# Patient Record
Sex: FEMALE | Race: WHITE | Employment: UNEMPLOYED | URBAN - METROPOLITAN AREA
[De-identification: names, ages, dates, MRNs, and addresses within clinical notes are randomized per-mention and may not be internally consistent; named-entity substitution may affect disease eponyms.]

---

## 2017-05-24 ENCOUNTER — OFFICE VISIT (OUTPATIENT)
Dept: ORTHOPEDIC SURGERY | Age: 47
End: 2017-05-24

## 2017-05-24 VITALS
DIASTOLIC BLOOD PRESSURE: 71 MMHG | HEART RATE: 78 BPM | HEIGHT: 65 IN | SYSTOLIC BLOOD PRESSURE: 107 MMHG | BODY MASS INDEX: 19.99 KG/M2 | WEIGHT: 120 LBS

## 2017-05-24 DIAGNOSIS — M25.561 CHRONIC PAIN OF RIGHT KNEE: ICD-10-CM

## 2017-05-24 DIAGNOSIS — M25.562 CHRONIC PAIN OF LEFT KNEE: ICD-10-CM

## 2017-05-24 DIAGNOSIS — S83.012D: ICD-10-CM

## 2017-05-24 DIAGNOSIS — M22.42 PATELLA, CHONDROMALACIA, LEFT: ICD-10-CM

## 2017-05-24 DIAGNOSIS — G89.29 CHRONIC PAIN OF RIGHT KNEE: ICD-10-CM

## 2017-05-24 DIAGNOSIS — M17.11 PRIMARY OSTEOARTHRITIS OF RIGHT KNEE: Primary | ICD-10-CM

## 2017-05-24 DIAGNOSIS — M17.12 PRIMARY OSTEOARTHRITIS OF LEFT KNEE: ICD-10-CM

## 2017-05-24 DIAGNOSIS — M22.2X2 PATELLOFEMORAL STRESS SYNDROME OF LEFT KNEE: ICD-10-CM

## 2017-05-24 DIAGNOSIS — G89.29 CHRONIC PAIN OF LEFT KNEE: ICD-10-CM

## 2017-05-24 PROCEDURE — 99214 OFFICE O/P EST MOD 30 MIN: CPT | Performed by: ORTHOPAEDIC SURGERY

## 2017-05-24 RX ORDER — TRAZODONE HYDROCHLORIDE 50 MG/1
TABLET ORAL
Refills: 1 | COMMUNITY
Start: 2017-03-15 | End: 2017-10-11

## 2017-05-24 RX ORDER — ALPRAZOLAM 0.5 MG/1
TABLET ORAL
Refills: 2 | COMMUNITY
Start: 2017-03-06

## 2017-05-24 RX ORDER — LORAZEPAM 0.5 MG/1
TABLET ORAL
Refills: 0 | COMMUNITY
Start: 2017-04-06 | End: 2017-08-30

## 2017-05-24 RX ORDER — RISPERIDONE 0.5 MG/1
3 TABLET, FILM COATED ORAL EVERY EVENING
Refills: 1 | COMMUNITY
Start: 2017-05-03

## 2017-05-24 RX ORDER — OLANZAPINE 10 MG/1
TABLET ORAL
Refills: 3 | COMMUNITY
Start: 2017-05-16 | End: 2017-08-30

## 2017-05-24 RX ORDER — MELOXICAM 15 MG/1
TABLET ORAL
Refills: 3 | COMMUNITY
Start: 2017-04-06 | End: 2017-11-01 | Stop reason: ALTCHOICE

## 2017-06-01 ENCOUNTER — TELEPHONE (OUTPATIENT)
Dept: ORTHOPEDIC SURGERY | Age: 47
End: 2017-06-01

## 2017-06-05 ENCOUNTER — TELEPHONE (OUTPATIENT)
Dept: ORTHOPEDIC SURGERY | Age: 47
End: 2017-06-05

## 2017-06-09 ENCOUNTER — OFFICE VISIT (OUTPATIENT)
Dept: ORTHOPEDIC SURGERY | Age: 47
End: 2017-06-09

## 2017-06-09 VITALS
HEART RATE: 84 BPM | BODY MASS INDEX: 19.99 KG/M2 | WEIGHT: 120 LBS | DIASTOLIC BLOOD PRESSURE: 67 MMHG | HEIGHT: 65 IN | SYSTOLIC BLOOD PRESSURE: 106 MMHG

## 2017-06-09 DIAGNOSIS — M22.42 PATELLA, CHONDROMALACIA, LEFT: ICD-10-CM

## 2017-06-09 DIAGNOSIS — M25.562 CHRONIC PAIN OF LEFT KNEE: Primary | ICD-10-CM

## 2017-06-09 DIAGNOSIS — G89.29 CHRONIC PAIN OF LEFT KNEE: Primary | ICD-10-CM

## 2017-06-09 DIAGNOSIS — M22.2X2 PATELLOFEMORAL STRESS SYNDROME OF LEFT KNEE: ICD-10-CM

## 2017-06-09 DIAGNOSIS — M17.12 PRIMARY OSTEOARTHRITIS OF LEFT KNEE: ICD-10-CM

## 2017-06-09 DIAGNOSIS — S83.012S: ICD-10-CM

## 2017-06-09 PROCEDURE — 99213 OFFICE O/P EST LOW 20 MIN: CPT | Performed by: ORTHOPAEDIC SURGERY

## 2017-06-20 ENCOUNTER — EVALUATION (OUTPATIENT)
Dept: PHYSICAL THERAPY | Age: 47
End: 2017-06-20

## 2017-06-20 DIAGNOSIS — M25.562 CHRONIC PAIN OF LEFT KNEE: Primary | ICD-10-CM

## 2017-06-20 DIAGNOSIS — M22.42 CHONDROMALACIA PATELLA, LEFT: ICD-10-CM

## 2017-06-20 DIAGNOSIS — M17.12 PRIMARY OSTEOARTHRITIS OF LEFT KNEE: ICD-10-CM

## 2017-06-20 DIAGNOSIS — G89.29 CHRONIC PAIN OF LEFT KNEE: Primary | ICD-10-CM

## 2017-06-20 PROCEDURE — G8979 MOBILITY GOAL STATUS: HCPCS | Performed by: PHYSICAL THERAPIST

## 2017-06-20 PROCEDURE — 97161 PT EVAL LOW COMPLEX 20 MIN: CPT | Performed by: PHYSICAL THERAPIST

## 2017-06-20 PROCEDURE — 97530 THERAPEUTIC ACTIVITIES: CPT | Performed by: PHYSICAL THERAPIST

## 2017-06-20 PROCEDURE — G8978 MOBILITY CURRENT STATUS: HCPCS | Performed by: PHYSICAL THERAPIST

## 2017-06-20 PROCEDURE — 97110 THERAPEUTIC EXERCISES: CPT | Performed by: PHYSICAL THERAPIST

## 2017-07-31 ENCOUNTER — TELEPHONE (OUTPATIENT)
Dept: ORTHOPEDIC SURGERY | Age: 47
End: 2017-07-31

## 2017-08-18 ENCOUNTER — HOSPITAL ENCOUNTER (OUTPATIENT)
Dept: SURGERY | Age: 47
Discharge: HOME | End: 2017-07-11
Attending: ORTHOPAEDIC SURGERY | Admitting: ORTHOPAEDIC SURGERY

## 2017-08-22 RX ORDER — GABAPENTIN 300 MG/1
300 CAPSULE ORAL
Status: CANCELLED | OUTPATIENT
Start: 2017-08-22 | End: 2017-08-22

## 2017-08-22 RX ORDER — TRANEXAMIC ACID 650 1/1
1950 TABLET ORAL ONCE
Status: CANCELLED | OUTPATIENT
Start: 2017-08-22 | End: 2017-08-22

## 2017-08-23 ENCOUNTER — HOSPITAL ENCOUNTER (OUTPATIENT)
Dept: PREADMISSION TESTING | Age: 47
Discharge: HOME | End: 2017-08-23
Attending: ORTHOPAEDIC SURGERY | Admitting: ORTHOPAEDIC SURGERY

## 2017-08-23 VITALS
HEIGHT: 64 IN | HEART RATE: 80 BPM | BODY MASS INDEX: 22.02 KG/M2 | SYSTOLIC BLOOD PRESSURE: 86 MMHG | OXYGEN SATURATION: 100 % | TEMPERATURE: 97.1 F | RESPIRATION RATE: 16 BRPM | WEIGHT: 129 LBS | DIASTOLIC BLOOD PRESSURE: 57 MMHG

## 2017-08-23 LAB
ABO/RH: NORMAL
ANION GAP SERPL CALCULATED.3IONS-SCNC: 12 MMOL/L (ref 3–16)
ANTIBODY SCREEN: NORMAL
APTT: 29.9 SEC (ref 24.1–34.9)
BASOPHILS ABSOLUTE: 0 K/UL (ref 0–0.2)
BASOPHILS RELATIVE PERCENT: 0.4 %
BILIRUBIN URINE: NEGATIVE
BLOOD, URINE: NEGATIVE
BUN BLDV-MCNC: 9 MG/DL (ref 7–20)
CALCIUM SERPL-MCNC: 9.5 MG/DL (ref 8.3–10.6)
CHLORIDE BLD-SCNC: 100 MMOL/L (ref 99–110)
CLARITY: CLEAR
CO2: 27 MMOL/L (ref 21–32)
COLOR: YELLOW
CREAT SERPL-MCNC: 0.6 MG/DL (ref 0.6–1.1)
EKG ATRIAL RATE: 67 BPM
EKG DIAGNOSIS: NORMAL
EKG P AXIS: 29 DEGREES
EKG P-R INTERVAL: 128 MS
EKG Q-T INTERVAL: 396 MS
EKG QRS DURATION: 82 MS
EKG QTC CALCULATION (BAZETT): 418 MS
EKG R AXIS: 64 DEGREES
EKG T AXIS: 40 DEGREES
EKG VENTRICULAR RATE: 67 BPM
EOSINOPHILS ABSOLUTE: 0.1 K/UL (ref 0–0.6)
EOSINOPHILS RELATIVE PERCENT: 3.5 %
GFR AFRICAN AMERICAN: >60
GFR NON-AFRICAN AMERICAN: >60
GLUCOSE BLD-MCNC: 108 MG/DL (ref 70–99)
GLUCOSE URINE: NEGATIVE MG/DL
HCT VFR BLD CALC: 36.8 % (ref 36–48)
HEMOGLOBIN: 12.3 G/DL (ref 12–16)
INR BLD: 0.95 (ref 0.85–1.15)
KETONES, URINE: NEGATIVE MG/DL
LEUKOCYTE ESTERASE, URINE: NEGATIVE
LYMPHOCYTES ABSOLUTE: 1.2 K/UL (ref 1–5.1)
LYMPHOCYTES RELATIVE PERCENT: 28.3 %
MCH RBC QN AUTO: 31.4 PG (ref 26–34)
MCHC RBC AUTO-ENTMCNC: 33.6 G/DL (ref 31–36)
MCV RBC AUTO: 93.4 FL (ref 80–100)
MICROSCOPIC EXAMINATION: NORMAL
MONOCYTES ABSOLUTE: 0.5 K/UL (ref 0–1.3)
MONOCYTES RELATIVE PERCENT: 11.3 %
NEUTROPHILS ABSOLUTE: 2.4 K/UL (ref 1.7–7.7)
NEUTROPHILS RELATIVE PERCENT: 56.5 %
NITRITE, URINE: NEGATIVE
PDW BLD-RTO: 12.4 % (ref 12.4–15.4)
PH UA: 6
PLATELET # BLD: 248 K/UL (ref 135–450)
PMV BLD AUTO: 7.5 FL (ref 5–10.5)
POTASSIUM SERPL-SCNC: 3.8 MMOL/L (ref 3.5–5.1)
PROTEIN UA: NEGATIVE MG/DL
PROTHROMBIN TIME: 10.7 SEC (ref 9.6–13)
RBC # BLD: 3.94 M/UL (ref 4–5.2)
SODIUM BLD-SCNC: 139 MMOL/L (ref 136–145)
SPECIFIC GRAVITY UA: 1.01
URINE TYPE: NORMAL
UROBILINOGEN, URINE: 0.2 E.U./DL
WBC # BLD: 4.2 K/UL (ref 4–11)

## 2017-08-23 PROCEDURE — 93010 ELECTROCARDIOGRAM REPORT: CPT | Performed by: INTERNAL MEDICINE

## 2017-08-23 RX ORDER — PANTOPRAZOLE SODIUM 40 MG/1
40 GRANULE, DELAYED RELEASE ORAL
COMMUNITY

## 2017-08-23 RX ORDER — ACETAMINOPHEN 325 MG/1
650 TABLET ORAL EVERY 6 HOURS PRN
COMMUNITY
End: 2017-11-01 | Stop reason: ALTCHOICE

## 2017-08-23 RX ORDER — DICYCLOMINE HYDROCHLORIDE 10 MG/1
10 CAPSULE ORAL
COMMUNITY

## 2017-08-23 RX ORDER — POLYETHYLENE GLYCOL 3350 17 G/17G
17 POWDER, FOR SOLUTION ORAL DAILY PRN
COMMUNITY
End: 2017-11-01 | Stop reason: ALTCHOICE

## 2017-08-24 LAB
MRSA SCREEN RT-PCR: ABNORMAL
URINE CULTURE, ROUTINE: NORMAL

## 2017-08-30 ENCOUNTER — OFFICE VISIT (OUTPATIENT)
Dept: ORTHOPEDIC SURGERY | Age: 47
End: 2017-08-30

## 2017-08-30 VITALS
WEIGHT: 128.97 LBS | BODY MASS INDEX: 21.49 KG/M2 | DIASTOLIC BLOOD PRESSURE: 66 MMHG | HEIGHT: 65 IN | SYSTOLIC BLOOD PRESSURE: 100 MMHG | HEART RATE: 78 BPM

## 2017-08-30 DIAGNOSIS — M25.462 EFFUSION OF LEFT KNEE: ICD-10-CM

## 2017-08-30 DIAGNOSIS — M25.562 CHRONIC PAIN OF LEFT KNEE: ICD-10-CM

## 2017-08-30 DIAGNOSIS — S83.012D: Primary | ICD-10-CM

## 2017-08-30 DIAGNOSIS — G89.29 CHRONIC PAIN OF LEFT KNEE: ICD-10-CM

## 2017-08-30 DIAGNOSIS — M22.2X2 PATELLOFEMORAL PAIN SYNDROME OF LEFT KNEE: ICD-10-CM

## 2017-08-30 PROCEDURE — 99213 OFFICE O/P EST LOW 20 MIN: CPT | Performed by: ORTHOPAEDIC SURGERY

## 2017-09-18 ENCOUNTER — EVALUATION (OUTPATIENT)
Dept: PHYSICAL THERAPY | Age: 47
End: 2017-09-18

## 2017-09-18 DIAGNOSIS — R53.1 DECREASED STRENGTH: ICD-10-CM

## 2017-09-18 DIAGNOSIS — M25.60 DECREASED RANGE OF MOTION: Primary | ICD-10-CM

## 2017-09-18 DIAGNOSIS — M17.12 OSTEOARTHRITIS OF LEFT KNEE, UNSPECIFIED OSTEOARTHRITIS TYPE: ICD-10-CM

## 2017-09-18 DIAGNOSIS — Z98.890 S/P KNEE SURGERY: Primary | ICD-10-CM

## 2017-09-18 PROCEDURE — 97110 THERAPEUTIC EXERCISES: CPT | Performed by: PHYSICAL THERAPIST

## 2017-09-18 PROCEDURE — G8978 MOBILITY CURRENT STATUS: HCPCS | Performed by: PHYSICAL THERAPIST

## 2017-09-18 PROCEDURE — 97016 VASOPNEUMATIC DEVICE THERAPY: CPT | Performed by: PHYSICAL THERAPIST

## 2017-09-18 PROCEDURE — 97161 PT EVAL LOW COMPLEX 20 MIN: CPT | Performed by: PHYSICAL THERAPIST

## 2017-09-18 PROCEDURE — G8979 MOBILITY GOAL STATUS: HCPCS | Performed by: PHYSICAL THERAPIST

## 2017-09-18 PROCEDURE — 97014 ELECTRIC STIMULATION THERAPY: CPT | Performed by: PHYSICAL THERAPIST

## 2017-09-20 ENCOUNTER — TREATMENT (OUTPATIENT)
Dept: PHYSICAL THERAPY | Age: 47
End: 2017-09-20

## 2017-09-20 ENCOUNTER — OFFICE VISIT (OUTPATIENT)
Dept: ORTHOPEDIC SURGERY | Age: 47
End: 2017-09-20

## 2017-09-20 VITALS
BODY MASS INDEX: 21.53 KG/M2 | HEART RATE: 86 BPM | WEIGHT: 126.1 LBS | SYSTOLIC BLOOD PRESSURE: 103 MMHG | DIASTOLIC BLOOD PRESSURE: 60 MMHG | HEIGHT: 64 IN

## 2017-09-20 DIAGNOSIS — M25.562 CHRONIC PAIN OF LEFT KNEE: ICD-10-CM

## 2017-09-20 DIAGNOSIS — R53.1 DECREASED STRENGTH: ICD-10-CM

## 2017-09-20 DIAGNOSIS — M22.42 CHONDROMALACIA PATELLA, LEFT: ICD-10-CM

## 2017-09-20 DIAGNOSIS — M25.60 DECREASED RANGE OF MOTION: Primary | ICD-10-CM

## 2017-09-20 DIAGNOSIS — G89.29 CHRONIC PAIN OF LEFT KNEE: ICD-10-CM

## 2017-09-20 DIAGNOSIS — Z96.652 STATUS POST LEFT PARTIAL KNEE REPLACEMENT: Primary | ICD-10-CM

## 2017-09-20 DIAGNOSIS — M17.12 OSTEOARTHRITIS OF LEFT KNEE, UNSPECIFIED OSTEOARTHRITIS TYPE: ICD-10-CM

## 2017-09-20 PROCEDURE — 97014 ELECTRIC STIMULATION THERAPY: CPT | Performed by: PHYSICAL THERAPIST

## 2017-09-20 PROCEDURE — 97016 VASOPNEUMATIC DEVICE THERAPY: CPT | Performed by: PHYSICAL THERAPIST

## 2017-09-20 PROCEDURE — 97110 THERAPEUTIC EXERCISES: CPT | Performed by: PHYSICAL THERAPIST

## 2017-09-20 PROCEDURE — 99024 POSTOP FOLLOW-UP VISIT: CPT | Performed by: ORTHOPAEDIC SURGERY

## 2017-09-25 ENCOUNTER — TREATMENT (OUTPATIENT)
Dept: PHYSICAL THERAPY | Age: 47
End: 2017-09-25

## 2017-09-25 DIAGNOSIS — M25.60 DECREASED RANGE OF MOTION: Primary | ICD-10-CM

## 2017-09-25 DIAGNOSIS — M17.12 OSTEOARTHRITIS OF LEFT KNEE, UNSPECIFIED OSTEOARTHRITIS TYPE: ICD-10-CM

## 2017-09-25 DIAGNOSIS — M25.562 CHRONIC PAIN OF LEFT KNEE: ICD-10-CM

## 2017-09-25 DIAGNOSIS — R53.1 DECREASED STRENGTH: ICD-10-CM

## 2017-09-25 DIAGNOSIS — G89.29 CHRONIC PAIN OF LEFT KNEE: ICD-10-CM

## 2017-09-25 DIAGNOSIS — M22.42 CHONDROMALACIA PATELLA, LEFT: ICD-10-CM

## 2017-09-25 PROCEDURE — 97016 VASOPNEUMATIC DEVICE THERAPY: CPT | Performed by: PHYSICAL THERAPIST

## 2017-09-25 PROCEDURE — 97110 THERAPEUTIC EXERCISES: CPT | Performed by: PHYSICAL THERAPIST

## 2017-09-25 PROCEDURE — 97014 ELECTRIC STIMULATION THERAPY: CPT | Performed by: PHYSICAL THERAPIST

## 2017-09-29 ENCOUNTER — TREATMENT (OUTPATIENT)
Dept: PHYSICAL THERAPY | Age: 47
End: 2017-09-29

## 2017-09-29 DIAGNOSIS — M17.12 OSTEOARTHRITIS OF LEFT KNEE, UNSPECIFIED OSTEOARTHRITIS TYPE: ICD-10-CM

## 2017-09-29 DIAGNOSIS — G89.29 CHRONIC PAIN OF LEFT KNEE: ICD-10-CM

## 2017-09-29 DIAGNOSIS — M25.562 CHRONIC PAIN OF LEFT KNEE: ICD-10-CM

## 2017-09-29 DIAGNOSIS — R53.1 DECREASED STRENGTH: ICD-10-CM

## 2017-09-29 DIAGNOSIS — M22.42 CHONDROMALACIA PATELLA, LEFT: ICD-10-CM

## 2017-09-29 DIAGNOSIS — M25.60 DECREASED RANGE OF MOTION: Primary | ICD-10-CM

## 2017-09-29 PROCEDURE — 97110 THERAPEUTIC EXERCISES: CPT | Performed by: PHYSICAL THERAPIST

## 2017-09-29 PROCEDURE — 97016 VASOPNEUMATIC DEVICE THERAPY: CPT | Performed by: PHYSICAL THERAPIST

## 2017-09-29 PROCEDURE — 97014 ELECTRIC STIMULATION THERAPY: CPT | Performed by: PHYSICAL THERAPIST

## 2017-10-04 ENCOUNTER — TREATMENT (OUTPATIENT)
Dept: PHYSICAL THERAPY | Age: 47
End: 2017-10-04

## 2017-10-04 DIAGNOSIS — R53.1 DECREASED STRENGTH: ICD-10-CM

## 2017-10-04 DIAGNOSIS — M17.12 OSTEOARTHRITIS OF LEFT KNEE, UNSPECIFIED OSTEOARTHRITIS TYPE: ICD-10-CM

## 2017-10-04 DIAGNOSIS — M22.42 CHONDROMALACIA PATELLA, LEFT: ICD-10-CM

## 2017-10-04 DIAGNOSIS — M25.562 CHRONIC PAIN OF LEFT KNEE: ICD-10-CM

## 2017-10-04 DIAGNOSIS — G89.29 CHRONIC PAIN OF LEFT KNEE: ICD-10-CM

## 2017-10-04 DIAGNOSIS — M25.60 DECREASED RANGE OF MOTION: Primary | ICD-10-CM

## 2017-10-04 PROCEDURE — 97110 THERAPEUTIC EXERCISES: CPT | Performed by: PHYSICAL THERAPIST

## 2017-10-04 PROCEDURE — 97016 VASOPNEUMATIC DEVICE THERAPY: CPT | Performed by: PHYSICAL THERAPIST

## 2017-10-04 PROCEDURE — 97014 ELECTRIC STIMULATION THERAPY: CPT | Performed by: PHYSICAL THERAPIST

## 2017-10-04 NOTE — FLOWSHEET NOTE
Omar Clifton PT, Ramesh Brooks 89    Physical Therapist [place] license #[id number]  Physical Therapist [place] license #[id number]

## 2017-10-06 ENCOUNTER — TREATMENT (OUTPATIENT)
Dept: PHYSICAL THERAPY | Age: 47
End: 2017-10-06

## 2017-10-06 DIAGNOSIS — M25.562 CHRONIC PAIN OF LEFT KNEE: ICD-10-CM

## 2017-10-06 DIAGNOSIS — M25.60 DECREASED RANGE OF MOTION: Primary | ICD-10-CM

## 2017-10-06 DIAGNOSIS — R53.1 DECREASED STRENGTH: ICD-10-CM

## 2017-10-06 DIAGNOSIS — G89.29 CHRONIC PAIN OF LEFT KNEE: ICD-10-CM

## 2017-10-06 DIAGNOSIS — M17.12 OSTEOARTHRITIS OF LEFT KNEE, UNSPECIFIED OSTEOARTHRITIS TYPE: ICD-10-CM

## 2017-10-06 PROCEDURE — 97110 THERAPEUTIC EXERCISES: CPT | Performed by: PHYSICAL THERAPIST

## 2017-10-06 PROCEDURE — 97016 VASOPNEUMATIC DEVICE THERAPY: CPT | Performed by: PHYSICAL THERAPIST

## 2017-10-06 PROCEDURE — 97014 ELECTRIC STIMULATION THERAPY: CPT | Performed by: PHYSICAL THERAPIST

## 2017-10-06 NOTE — FLOWSHEET NOTE
[facility]   Phone: [phone]    Fax: [phone]                                                       Physical Therapy Daily Treatment Note  Date:  10/6/2017    Patient Name:  Haven Leyden    :  1970  MRN: [id number]  Medical/Treatment Diagnosis Information:  Diagnosis: L patella femoral replacement; Z98.890 9/15/17  Treatment Diagnosis: dec ROM, dec strength, knee pain, dec gt skills,   Insurance/Certification information:  PT Insurance Information: [employer]  Physician Information:  Referring Practitioner: Guy Files of care signed (Y/N):     Date of Patient follow up with Physician: today    G-Code (if applicable):      Date G-Code Applied:  17  PT G-Codes  Functional Assessment Tool Used: LEFS  Score: 880=10%  Functional Limitation: Mobility: Walking and moving around  Mobility: Walking and Moving Around Current Status (): At least 80 percent but less than 100 percent impaired, limited or restricted  Mobility: Walking and Moving Around Goal Status (): At least 1 percent but less than 20 percent impaired, limited or restricted    Progress Note: [x]  Yes  []  No  Next due by: Visit #10       Latex Allergy:  [x]NO      []YES  Preferred Language for Healthcare:   [x]English       []other:    Visit # Insurance Allowable   6      Pain level:  3/10 w/ percocet     SUBJECTIVE:  Pt is having pain in R knee that is bothering her. L knee pain is improving and she is doing more. OBJECTIVE: See eval  Observation: mod edema L knee but not extending into lower leg; incision healing well w/o signs of infection noted; steri strips removed; Amb w/ 2 crutches and dec stride length w/ dec knee ext and limp noted;   Test measurements:      Flexibility L R Comment   Hamstring Min tightness  10/6/17   Gastroc Min tightness     ITB      Quad                ROM PROM AROM Overpressure Comment    L R L R L R 10/6/17   Flexion 130         Extension   0 proximal hip and LE with self care and ADLs  [] (60146) Gait Re-education- Provided training and instruction to the patient for proper LE, core and proximal hip recruitment and positioning and eccentric body weight control with ambulation re-education including up and down stairs     Home Exercise Program:    [x] (04574) Reviewed/Progressed HEP activities related to strengthening, flexibility, endurance, ROM of core, proximal hip and LE for functional self-care, mobility, lifting and ambulation/stair navigation 9/18/17 written HEP given 9/29/17 Reviewed  [] (81164)Reviewed/Progressed HEP activities related to improving balance, coordination, kinesthetic sense, posture, motor skill, proprioception of core, proximal hip and LE for self care, mobility, lifting, and ambulation/stair navigation      Manual Treatments:  PROM / STM / Oscillations-Mobs:  G-I, II, III, IV (PA's, Inf., Post.)  [] (56031) Provided manual therapy to mobilize LE, proximal hip and/or LS spine soft tissue/joints for the purpose of modulating pain, promoting relaxation,  increasing ROM, reducing/eliminating soft tissue swelling/inflammation/restriction, improving soft tissue extensibility and allowing for proper ROM for normal function with self care, mobility, lifting and ambulation. Modalities:  VASO x15 min    Charges:  Timed Code Treatment Minutes: 35   Total Treatment Minutes: 60     [] EVAL (LOW) 60740 (typically 20 minutes face-to-face)  [] EVAL (MOD) 62942 (typically 30 minutes face-to-face)  [] EVAL (HIGH) 36917 (typically 45 minutes face-to-face)  [] RE-EVAL   [x] AO(32828) x  2   [] IONTO  [] NMR (34644) x      [x] VASO  [] Manual (15979) x       [] Other:  [] TA (57937) x       [] Mech Traction (05407)  [] ES(attended) (57623)      [x] ES (un) (82757): x15 min      GOALS:  Patient stated goal: wants to get ROM back and walk w/o pain; Therapist goals for Patient:   Short Term Goals: To be achieved in: 2 weeks  1.  Independent in HEP and progression per patient tolerance, in order to prevent re-injury. MET  2. Patient will have a decrease in pain to facilitate improvement in movement, function, and ADLs as indicated by Functional Deficits. Long Term Goals: To be achieved in: 12 weeks  1. Disability index score of 10% or less for the LEFS to assist with reaching prior level of function. 2. Patient will demonstrate increased AROM to 0-130 to allow for proper joint functioning as indicated by patients Functional Deficits. 3. Patient will demonstrate an increase in Strength to good proximal hip strength and control, within 5lb HHD in LE to allow for proper functional mobility as indicated by patients Functional Deficits. 4. Patient will return to all functional activities without increased symptoms or restriction. 5. Pt will be able to walk w/o deviations or AD for 45 min for functional activities. (patient specific functional goal    Progression Towards Functional goals:  [] Patient is progressing as expected towards functional goals listed. [] Progression is slowed due to complexities listed. [] Progression has been slowed due to co-morbidities. [x] Plan just implemented, too soon to assess goals progression  [] Other:     ASSESSMENT: Slight dec in ROM in L knee. Quad contraction a little difficult today but did improve w/ biofeedback. Cuing on gt skills w/o AD and steps to eliminate deviations. More edema in L knee today that may be related to inc [address] on L LE. Treatment/Activity Tolerance:  [x] Patient tolerated treatment well [x] Patient limited by fatique  [] Patient limited by pain  [] Patient limited by other medical complications  [] Other:     Prognosis: [x] Good [] Fair  [] Poor    Patient Requires Follow-up: [x] Yes  [] No    PLAN: Cont therapy s/p L knee surg. Consider Inc ht of steps next session.   [x] Continue per plan of care [] Alter current plan (see comments)  [x] Plan of care initiated [] Hold pending MD visit [] Discharge    Electronically signed by: Mary Jean PT, Ramesh Brooks 89    Physical Therapist [facility] license #[id number]  Physical Therapist [place] license #[id number]

## 2017-10-11 ENCOUNTER — TREATMENT (OUTPATIENT)
Dept: PHYSICAL THERAPY | Age: 47
End: 2017-10-11

## 2017-10-11 ENCOUNTER — OFFICE VISIT (OUTPATIENT)
Dept: ORTHOPEDIC SURGERY | Age: 47
End: 2017-10-11

## 2017-10-11 VITALS
DIASTOLIC BLOOD PRESSURE: 71 MMHG | HEIGHT: 64 IN | HEART RATE: 102 BPM | WEIGHT: 126.1 LBS | BODY MASS INDEX: 21.53 KG/M2 | SYSTOLIC BLOOD PRESSURE: 103 MMHG

## 2017-10-11 DIAGNOSIS — G89.29 CHRONIC PAIN OF LEFT KNEE: ICD-10-CM

## 2017-10-11 DIAGNOSIS — Z96.652 STATUS POST LEFT PARTIAL KNEE REPLACEMENT: Primary | ICD-10-CM

## 2017-10-11 DIAGNOSIS — M25.562 CHRONIC PAIN OF LEFT KNEE: ICD-10-CM

## 2017-10-11 DIAGNOSIS — M17.12 OSTEOARTHRITIS OF LEFT KNEE, UNSPECIFIED OSTEOARTHRITIS TYPE: ICD-10-CM

## 2017-10-11 DIAGNOSIS — R53.1 DECREASED STRENGTH: ICD-10-CM

## 2017-10-11 DIAGNOSIS — M22.42 CHONDROMALACIA PATELLA, LEFT: ICD-10-CM

## 2017-10-11 DIAGNOSIS — M25.60 DECREASED RANGE OF MOTION: Primary | ICD-10-CM

## 2017-10-11 PROCEDURE — 99024 POSTOP FOLLOW-UP VISIT: CPT | Performed by: ORTHOPAEDIC SURGERY

## 2017-10-11 PROCEDURE — 73562 X-RAY EXAM OF KNEE 3: CPT | Performed by: ORTHOPAEDIC SURGERY

## 2017-10-11 PROCEDURE — 97014 ELECTRIC STIMULATION THERAPY: CPT | Performed by: PHYSICAL THERAPIST

## 2017-10-11 PROCEDURE — 97110 THERAPEUTIC EXERCISES: CPT | Performed by: PHYSICAL THERAPIST

## 2017-10-11 PROCEDURE — 97016 VASOPNEUMATIC DEVICE THERAPY: CPT | Performed by: PHYSICAL THERAPIST

## 2017-10-11 RX ORDER — PREDNISONE 20 MG/1
20 TABLET ORAL 2 TIMES DAILY
Qty: 20 TABLET | Refills: 0 | Status: SHIPPED | OUTPATIENT
Start: 2017-10-11 | End: 2017-11-01 | Stop reason: ALTCHOICE

## 2017-10-11 RX ORDER — HYDROCODONE BITARTRATE AND ACETAMINOPHEN 5; 325 MG/1; MG/1
2 TABLET ORAL 3 TIMES DAILY PRN
Qty: 40 TABLET | Refills: 0 | Status: SHIPPED | OUTPATIENT
Start: 2017-10-11 | End: 2017-11-22 | Stop reason: SDUPTHER

## 2017-10-11 NOTE — PLAN OF CARE
ambulation/stair navigation      Manual Treatments:  PROM / STM / Oscillations-Mobs:  G-I, II, III, IV (PA's, Inf., Post.)  [] (92226) Provided manual therapy to mobilize LE, proximal hip and/or LS spine soft tissue/joints for the purpose of modulating pain, promoting relaxation,  increasing ROM, reducing/eliminating soft tissue swelling/inflammation/restriction, improving soft tissue extensibility and allowing for proper ROM for normal function with self care, mobility, lifting and ambulation. Modalities:  VASO x15 min    Charges:  Timed Code Treatment Minutes: 35   Total Treatment Minutes: 60     [] EVAL (LOW) 23199 (typically 20 minutes face-to-face)  [] EVAL (MOD) 94631 (typically 30 minutes face-to-face)  [] EVAL (HIGH) 27202 (typically 45 minutes face-to-face)  [] RE-EVAL   [x] LY(87345) x  2   [] IONTO  [] NMR (98966) x      [x] VASO  [] Manual (38037) x       [] Other:  [] TA (59981) x       [] Mech Traction (82993)  [] ES(attended) (78620)      [x] ES (un) (24562): x15 min      GOALS:  Patient stated goal: wants to get ROM back and walk w/o pain; Therapist goals for Patient:   Short Term Goals: To be achieved in: 2 weeks  1. Independent in HEP and progression per patient tolerance, in order to prevent re-injury. MET  2. Patient will have a decrease in pain to facilitate improvement in movement, function, and ADLs as indicated by Functional Deficits. Long Term Goals: To be achieved in: 12 weeks  1. Disability index score of 10% or less for the LEFS to assist with reaching prior level of function. 2. Patient will demonstrate increased AROM to 0-130 to allow for proper joint functioning as indicated by patients Functional Deficits. 3. Patient will demonstrate an increase in Strength to good proximal hip strength and control, within 5lb HHD in LE to allow for proper functional mobility as indicated by patients Functional Deficits.    4. Patient will return to all functional activities without increased symptoms or restriction. 5. Pt will be able to walk w/o deviations or AD for 45 min for functional activities. (patient specific functional goal    Progression Towards Functional goals:  [] Patient is progressing as expected towards functional goals listed. [] Progression is slowed due to complexities listed. [] Progression has been slowed due to co-morbidities. [x] Plan just implemented, too soon to assess goals progression  [] Other:     ASSESSMENT: Pt is more active w/ inc edema in L knee. Quad is having a harder time darrel. Function has improved. Strength has taken a small step back due to fair- quad contraction. Pt is having some inc symptoms of depression w/ MD and psychiatrist away of symptoms. Treatment/Activity Tolerance:  [x] Patient tolerated treatment well [x] Patient limited by fatique  [] Patient limited by pain  [] Patient limited by other medical complications  [] Other:     Prognosis: [x] Good [] Fair  [] Poor    Patient Requires Follow-up: [x] Yes  [] No    PLAN: Cont therapy s/p L knee surg. Consider adding wts next session.   [x] Continue per plan of care [] Alter current plan (see comments)  [x] Plan of care initiated [] Hold pending MD visit [] Discharge    Electronically signed by: Ferdinand Stauffer PT, Ramesh Brooks 89    Physical Therapist [place] license #[id number]  Physical Therapist [place] license #[id number]

## 2017-10-13 ENCOUNTER — TREATMENT (OUTPATIENT)
Dept: PHYSICAL THERAPY | Age: 47
End: 2017-10-13

## 2017-10-13 DIAGNOSIS — M25.60 DECREASED RANGE OF MOTION: Primary | ICD-10-CM

## 2017-10-13 DIAGNOSIS — M25.562 CHRONIC PAIN OF LEFT KNEE: ICD-10-CM

## 2017-10-13 DIAGNOSIS — M17.12 OSTEOARTHRITIS OF LEFT KNEE, UNSPECIFIED OSTEOARTHRITIS TYPE: ICD-10-CM

## 2017-10-13 DIAGNOSIS — G89.29 CHRONIC PAIN OF LEFT KNEE: ICD-10-CM

## 2017-10-13 DIAGNOSIS — M17.12 PRIMARY OSTEOARTHRITIS OF LEFT KNEE: ICD-10-CM

## 2017-10-13 DIAGNOSIS — R53.1 DECREASED STRENGTH: ICD-10-CM

## 2017-10-13 DIAGNOSIS — M22.42 CHONDROMALACIA PATELLA, LEFT: ICD-10-CM

## 2017-10-13 PROCEDURE — 97016 VASOPNEUMATIC DEVICE THERAPY: CPT | Performed by: PHYSICAL THERAPIST

## 2017-10-13 PROCEDURE — 97014 ELECTRIC STIMULATION THERAPY: CPT | Performed by: PHYSICAL THERAPIST

## 2017-10-13 PROCEDURE — 97110 THERAPEUTIC EXERCISES: CPT | Performed by: PHYSICAL THERAPIST

## 2017-10-13 PROCEDURE — 97530 THERAPEUTIC ACTIVITIES: CPT | Performed by: PHYSICAL THERAPIST

## 2017-10-13 NOTE — FLOWSHEET NOTE
[facility]   Phone: [phone]    Fax: [phone]                                                       Physical Therapy Daily Treatment Note        Date:  10/13/2017    Patient Name:  Jose Mena    :  1970  MRN: [id number]  Medical/Treatment Diagnosis Information:  Diagnosis: L patella femoral replacement; Z98.890 9/15/17  Treatment Diagnosis: dec ROM, dec strength, knee pain, dec gt skills,   Insurance/Certification information:  PT Insurance Information: [place]  Physician Information:  Referring Practitioner: Rosie Chau of care signed (Y/N):     Date of Patient follow up with Physician: today    G-Code (if applicable):      Date G-Code Applied:  17  PT G-Codes  Functional Assessment Tool Used: LEFS  Score: 8/80=10%  Functional Limitation: Mobility: Walking and moving around  Mobility: Walking and Moving Around Current Status (): At least 80 percent but less than 100 percent impaired, limited or restricted  Mobility: Walking and Moving Around Goal Status (): At least 1 percent but less than 20 percent impaired, limited or restricted    Progress Note: [x]  Yes  []  No  Next due by: Visit #10       Latex Allergy:  [x]NO      []YES  Preferred Language for Healthcare:   [x]English       []other:    Visit # Insurance Allowable   8      Pain level:  3/10 w/ percocet     SUBJECTIVE:  Pt on steroid dose pack after MD appt and feeling better today. OBJECTIVE: See eval  Observation: min- mod edema L knee but not extending into lower leg; incision healing well w/o signs of infection noted; steri strips removed; Amb w/ cane and dec stride length w/ dec knee ext and limp noted;   Test measurements:      Flexibility L R Comment   Hamstring Min tightness  10/6/17   Gastroc Min tightness     ITB      Quad                ROM PROM AROM Overpressure Comment    L R L R L R 10/13/17   Flexion 135         Extension   0                             Strength L R Comment   Quad Fair+ Hamstring      Gastroc                    RESTRICTIONS/PRECAUTIONS: prior surgeries in knees in 1990, 2000, 2003    Exercises/Interventions:   Exercise/Equipment Resistance/Repetitions Other comments   Cardio/Warm-up     Bike     Treadmill          Stretching     Hamstring Seated 10\"x10    Hip Flexion     ITB     Grion     Quad     Inclined Calf Standing 10\"x10    Towel Pull          ROM     Passive ERMI 10\"x10    Active     Weight Shift     Weight Hangs     Sheet Pulls     Ankle Pumps           Patellar Glides     Medial     Superior     Inferior          STRENGTH     SLR     SAQ     Supine 4x10 1#    Prone     Abduction 3x10 1#    Adducton PS 10\"x10+ bridging    SLR+          Isometrics     Quad sets          CKC     Calf raises 3x10    Wall sits     Step ups L2 x20    1 leg stand     Squatting     CC TKE black 10\"x10    Balance      SLS eo 30\"x3 EC    PRE     Extension 10# 3x10  RANGE:   Flexion 20# 3x10 RANGE:   Leg Press 40# L LE 2x10  50# B LE 2 x10 RANGE:        Cable Column          Gt training Heel strike  and QS x5 min; w/o cane today w/ cuing    Manual/Modalities                 Therapeutic Exercise and NMR EXR  [x] (97350) Provided verbal/tactile cueing for activities related to strengthening, flexibility, endurance, ROM for improvements in LE, proximal hip, and core control with self care, mobility, lifting, ambulation.  [] (83680) Provided verbal/tactile cueing for activities related to improving balance, coordination, kinesthetic sense, posture, motor skill, proprioception  to assist with LE, proximal hip, and core control in self care, mobility, lifting, ambulation and eccentric single leg control.      NMR and Therapeutic Activities:    [] (06303 or 81939) Provided verbal/tactile cueing for activities related to improving balance, coordination, kinesthetic sense, posture, motor skill, proprioception and motor activation to allow for proper function of core, proximal hip and LE with self care and ADLs  [] (73009) Gait Re-education- Provided training and instruction to the patient for proper LE, core and proximal hip recruitment and positioning and eccentric body weight control with ambulation re-education including up and down stairs     Home Exercise Program:    [x] (95491) Reviewed/Progressed HEP activities related to strengthening, flexibility, endurance, ROM of core, proximal hip and LE for functional self-care, mobility, lifting and ambulation/stair navigation 9/18/17 written HEP given 9/29/17 Reviewed  [] (58997)Reviewed/Progressed HEP activities related to improving balance, coordination, kinesthetic sense, posture, motor skill, proprioception of core, proximal hip and LE for self care, mobility, lifting, and ambulation/stair navigation      Manual Treatments:  PROM / STM / Oscillations-Mobs:  G-I, II, III, IV (PA's, Inf., Post.)  [] (01991) Provided manual therapy to mobilize LE, proximal hip and/or LS spine soft tissue/joints for the purpose of modulating pain, promoting relaxation,  increasing ROM, reducing/eliminating soft tissue swelling/inflammation/restriction, improving soft tissue extensibility and allowing for proper ROM for normal function with self care, mobility, lifting and ambulation. Modalities:  VASO x15 min    Charges:  Timed Code Treatment Minutes: 40   Total Treatment Minutes: 60     [] EVAL (LOW) 22389 (typically 20 minutes face-to-face)  [] EVAL (MOD) 67140 (typically 30 minutes face-to-face)  [] EVAL (HIGH) 39265 (typically 45 minutes face-to-face)  [] RE-EVAL   [x] FQ(45953) x  2   [] IONTO  [] NMR (12214) x      [x] VASO  [] Manual (73599) x       [] Other:  [x] TA (10297) x  1    [] Mech Traction (04644)  [] ES(attended) (93934)      [x] ES (un) (14194): x15 min      GOALS:  Patient stated goal: wants to get ROM back and walk w/o pain; Therapist goals for Patient:   Short Term Goals: To be achieved in: 2 weeks  1.  Independent in HEP and progression per patient tolerance, in order to prevent re-injury. MET  2. Patient will have a decrease in pain to facilitate improvement in movement, function, and ADLs as indicated by Functional Deficits. Long Term Goals: To be achieved in: 12 weeks  1. Disability index score of 10% or less for the LEFS to assist with reaching prior level of function. 2. Patient will demonstrate increased AROM to 0-130 to allow for proper joint functioning as indicated by patients Functional Deficits. MET but continue to assess   3. Patient will demonstrate an increase in Strength to good proximal hip strength and control, within 5lb HHD in LE to allow for proper functional mobility as indicated by patients Functional Deficits. 4. Patient will return to all functional activities without increased symptoms or restriction. 5. Pt will be able to walk w/o deviations or AD for 45 min for functional activities. (patient specific functional goal    Progression Towards Functional goals:  [] Patient is progressing as expected towards functional goals listed. [] Progression is slowed due to complexities listed. [] Progression has been slowed due to co-morbidities. [x] Plan just implemented, too soon to assess goals progression  [] Other:     ASSESSMENT: Pt's motion improved to goal today w/ min effort. Strength improving w/ improved quad contraction today. Steps better w/ UE needed for assistance only. Gt pattern normal w/ slow alisha and concentration. Treatment/Activity Tolerance:  [x] Patient tolerated treatment well [x] Patient limited by fatique  [] Patient limited by pain  [] Patient limited by other medical complications  [] Other:     Prognosis: [x] Good [] Fair  [] Poor    Patient Requires Follow-up: [x] Yes  [] No    PLAN: Cont therapy s/p L knee surg. Progress WBing activities for strength in B LE's.   [x] Continue per plan of care [] Alter current plan (see comments)  [x] Plan of care initiated [] Hold pending MD visit [] Discharge    Electronically signed by: Megan Rodriguez PT, Ramesh Garcia    Physical Therapist [address] [place] [address] license #[id number]  Physical Therapist [place] license #[id number]

## 2017-10-17 ENCOUNTER — TELEPHONE (OUTPATIENT)
Dept: ORTHOPEDIC SURGERY | Age: 47
End: 2017-10-17

## 2017-10-17 NOTE — TELEPHONE ENCOUNTER
Patient called and states that she is having side effect from the Prednisone she is taking 20mg bid. She states that it is making he shake bad, keeping her awake, and increasing anxiety feeling. She states that she sees her Dr. Dennis Vásquez for her bipolar and depression, but she noticed a big difference when she stopped taking the Prednisone and started swelling again.  Please advise

## 2017-10-18 ENCOUNTER — TREATMENT (OUTPATIENT)
Dept: PHYSICAL THERAPY | Age: 47
End: 2017-10-18

## 2017-10-18 DIAGNOSIS — G89.29 CHRONIC PAIN OF LEFT KNEE: ICD-10-CM

## 2017-10-18 DIAGNOSIS — M17.12 OSTEOARTHRITIS OF LEFT KNEE, UNSPECIFIED OSTEOARTHRITIS TYPE: ICD-10-CM

## 2017-10-18 DIAGNOSIS — M17.12 PRIMARY OSTEOARTHRITIS OF LEFT KNEE: ICD-10-CM

## 2017-10-18 DIAGNOSIS — M25.562 CHRONIC PAIN OF LEFT KNEE: ICD-10-CM

## 2017-10-18 DIAGNOSIS — M22.42 CHONDROMALACIA PATELLA, LEFT: ICD-10-CM

## 2017-10-18 DIAGNOSIS — M25.60 DECREASED RANGE OF MOTION: Primary | ICD-10-CM

## 2017-10-18 DIAGNOSIS — R53.1 DECREASED STRENGTH: ICD-10-CM

## 2017-10-18 PROCEDURE — 97016 VASOPNEUMATIC DEVICE THERAPY: CPT | Performed by: PHYSICAL THERAPIST

## 2017-10-18 PROCEDURE — 97110 THERAPEUTIC EXERCISES: CPT | Performed by: PHYSICAL THERAPIST

## 2017-10-18 PROCEDURE — 97014 ELECTRIC STIMULATION THERAPY: CPT | Performed by: PHYSICAL THERAPIST

## 2017-10-18 PROCEDURE — 97530 THERAPEUTIC ACTIVITIES: CPT | Performed by: PHYSICAL THERAPIST

## 2017-10-18 NOTE — FLOWSHEET NOTE
with self care and ADLs  [] (28460) Gait Re-education- Provided training and instruction to the patient for proper LE, core and proximal hip recruitment and positioning and eccentric body weight control with ambulation re-education including up and down stairs     Home Exercise Program:    [x] (29749) Reviewed/Progressed HEP activities related to strengthening, flexibility, endurance, ROM of core, proximal hip and LE for functional self-care, mobility, lifting and ambulation/stair navigation 9/18/17 written HEP given 9/29/17 Reviewed  [] (21761)Reviewed/Progressed HEP activities related to improving balance, coordination, kinesthetic sense, posture, motor skill, proprioception of core, proximal hip and LE for self care, mobility, lifting, and ambulation/stair navigation      Manual Treatments:  PROM / STM / Oscillations-Mobs:  G-I, II, III, IV (PA's, Inf., Post.)  [] (00250) Provided manual therapy to mobilize LE, proximal hip and/or LS spine soft tissue/joints for the purpose of modulating pain, promoting relaxation,  increasing ROM, reducing/eliminating soft tissue swelling/inflammation/restriction, improving soft tissue extensibility and allowing for proper ROM for normal function with self care, mobility, lifting and ambulation. Modalities:  VASO x15 min    Charges:  Timed Code Treatment Minutes: 40   Total Treatment Minutes: 60     [] EVAL (LOW) 39792 (typically 20 minutes face-to-face)  [] EVAL (MOD) 74789 (typically 30 minutes face-to-face)  [] EVAL (HIGH) 76030 (typically 45 minutes face-to-face)  [] RE-EVAL   [x] OR(18759) x  2   [] IONTO  [] NMR (64353) x      [x] VASO  [] Manual (05226) x       [] Other:  [x] TA (91101) x  1    [] Mech Traction (63131)  [] ES(attended) (30281)      [x] ES (un) (73392): x15 min      GOALS:  Patient stated goal: wants to get ROM back and walk w/o pain; Therapist goals for Patient:   Short Term Goals: To be achieved in: 2 weeks  1.  Independent in HEP and progression per patient tolerance, in order to prevent re-injury. MET  2. Patient will have a decrease in pain to facilitate improvement in movement, function, and ADLs as indicated by Functional Deficits. Long Term Goals: To be achieved in: 12 weeks  1. Disability index score of 10% or less for the LEFS to assist with reaching prior level of function. 2. Patient will demonstrate increased AROM to 0-130 to allow for proper joint functioning as indicated by patients Functional Deficits. MET but continue to assess   3. Patient will demonstrate an increase in Strength to good proximal hip strength and control, within 5lb HHD in LE to allow for proper functional mobility as indicated by patients Functional Deficits. 4. Patient will return to all functional activities without increased symptoms or restriction. 5. Pt will be able to walk w/o deviations or AD for 45 min for functional activities. (patient specific functional goal    Progression Towards Functional goals:  [] Patient is progressing as expected towards functional goals listed. [] Progression is slowed due to complexities listed. [] Progression has been slowed due to co-morbidities. [x] Plan just implemented, too soon to assess goals progression  [] Other:     ASSESSMENT: Min effort needed for ROM goal. Progressing well w/ inc in function. Gt pattern guarded w/o AD. Pt is still very weak in bilat LE and fatigues easily w/ program. Pain is better today and able to progress program.    Treatment/Activity Tolerance:  [x] Patient tolerated treatment well [x] Patient limited by fatique  [] Patient limited by pain  [] Patient limited by other medical complications  [] Other:     Prognosis: [x] Good [] Fair  [] Poor    Patient Requires Follow-up: [x] Yes  [] No    PLAN: Cont therapy s/p L knee surg. Progress wt on exercises.   [x] Continue per plan of care [] Alter current plan (see comments)  [x] Plan of care initiated [] Hold pending MD visit []

## 2017-10-20 ENCOUNTER — TREATMENT (OUTPATIENT)
Dept: PHYSICAL THERAPY | Age: 47
End: 2017-10-20

## 2017-10-20 DIAGNOSIS — G89.29 CHRONIC PAIN OF LEFT KNEE: ICD-10-CM

## 2017-10-20 DIAGNOSIS — M25.60 DECREASED RANGE OF MOTION: Primary | ICD-10-CM

## 2017-10-20 DIAGNOSIS — R53.1 DECREASED STRENGTH: ICD-10-CM

## 2017-10-20 DIAGNOSIS — M17.12 OSTEOARTHRITIS OF LEFT KNEE, UNSPECIFIED OSTEOARTHRITIS TYPE: ICD-10-CM

## 2017-10-20 DIAGNOSIS — M25.562 CHRONIC PAIN OF LEFT KNEE: ICD-10-CM

## 2017-10-20 DIAGNOSIS — M22.42 CHONDROMALACIA PATELLA, LEFT: ICD-10-CM

## 2017-10-20 PROCEDURE — 97530 THERAPEUTIC ACTIVITIES: CPT | Performed by: PHYSICAL THERAPIST

## 2017-10-20 PROCEDURE — 97016 VASOPNEUMATIC DEVICE THERAPY: CPT | Performed by: PHYSICAL THERAPIST

## 2017-10-20 PROCEDURE — 97014 ELECTRIC STIMULATION THERAPY: CPT | Performed by: PHYSICAL THERAPIST

## 2017-10-20 PROCEDURE — 97110 THERAPEUTIC EXERCISES: CPT | Performed by: PHYSICAL THERAPIST

## 2017-10-20 NOTE — FLOWSHEET NOTE
[facility]   Phone: [phone]    Fax: [phone]                                                       Physical Therapy Daily Treatment Note        Date:  10/20/2017    Patient Name:  Svetlana Antonio    :  1970  MRN: [id number]  Medical/Treatment Diagnosis Information:  Diagnosis: L patella femoral replacement; Z98.890 9/15/17  Treatment Diagnosis: dec ROM, dec strength, knee pain, dec gt skills,   Insurance/Certification information:  PT Insurance Information: Lorri  Physician Information:  Referring Practitioner: Rosas Garrido of care signed (Y/N):     Date of Patient follow up with Physician: today    G-Code (if applicable):      Date G-Code Applied:  17  PT G-Codes  Functional Assessment Tool Used: LEFS  Score: 8/80=10%  Functional Limitation: Mobility: Walking and moving around  Mobility: Walking and Moving Around Current Status (): At least 80 percent but less than 100 percent impaired, limited or restricted  Mobility: Walking and Moving Around Goal Status (): At least 1 percent but less than 20 percent impaired, limited or restricted    Progress Note: [x]  Yes  []  No  Next due by: Visit #10       Latex Allergy:  [x]NO      []YES  Preferred Language for Healthcare:   [x]English       []other:    Visit # Insurance Allowable   10      Pain level:  3/10 w/ percocet     SUBJECTIVE:  Pt states that she fell down the stairs but was able to land on her bottom. Pt very depressed about slow rehab process. OBJECTIVE: See eval  Observation: min- mod edema L knee; incision healing well w/o signs of infection noted; Quad fair+; Amb w/ cane and dec stride length w/ dec knee ext and limp noted;   Test measurements:      Flexibility L R Comment   Hamstring Min tightness  10/6/17   Gastroc Min tightness     ITB      Quad                ROM PROM AROM Overpressure Comment    L R L R L R 10/20/17   Flexion 135         Extension   0                             Strength L R Comment Quad Fair+     Hamstring      Gastroc                    RESTRICTIONS/PRECAUTIONS: prior surgeries in knees in 1990, 2000, 2003    Exercises/Interventions:   Exercise/Equipment Resistance/Repetitions Other comments   Cardio/Warm-up     Bike     Treadmill          Stretching     Hamstring Seated 10\"x10    Hip Flexion     ITB     Grion     Quad     Inclined Calf Standing 10\"x10    Towel Pull          ROM     Passive ERMI 10\"x10    Active     Weight Shift     Weight Hangs     Sheet Pulls     Ankle Pumps           Patellar Glides     Medial     Superior     Inferior          STRENGTH     SLR     SAQ     Supine 4x10 2#    Prone     Abduction 3x10 2#    Adducton PS 10\"x10+ bridging    SLR+          Isometrics     Quad sets          CKC     Calf raises 3x10    Wall sits x4  30\" w/ PS    Step ups L3 x20    1 leg stand     Squatting     CC TKE black 10\"x10    Balance      SLS eo 30\"x3 EC, airex    PRE     Extension 15# 3x10  RANGE:   Flexion 25# 3x10 RANGE:   Leg Press 45# L LE 2x10  65# B LE 2 x10 RANGE:        Cable Column          Gt training Heel strike  and QS x5 min; w/o cane today w/ cuing    Manual/Modalities                 Therapeutic Exercise and NMR EXR  [x] (82973) Provided verbal/tactile cueing for activities related to strengthening, flexibility, endurance, ROM for improvements in LE, proximal hip, and core control with self care, mobility, lifting, ambulation.  [] (06367) Provided verbal/tactile cueing for activities related to improving balance, coordination, kinesthetic sense, posture, motor skill, proprioception  to assist with LE, proximal hip, and core control in self care, mobility, lifting, ambulation and eccentric single leg control.      NMR and Therapeutic Activities:    [] (01797 or 08267) Provided verbal/tactile cueing for activities related to improving balance, coordination, kinesthetic sense, posture, motor skill, proprioception and motor activation to allow for proper function of core, HEP and progression per patient tolerance, in order to prevent re-injury. MET  2. Patient will have a decrease in pain to facilitate improvement in movement, function, and ADLs as indicated by Functional Deficits. Long Term Goals: To be achieved in: 12 weeks  1. Disability index score of 10% or less for the LEFS to assist with reaching prior level of function. 2. Patient will demonstrate increased AROM to 0-130 to allow for proper joint functioning as indicated by patients Functional Deficits. MET but continue to assess   3. Patient will demonstrate an increase in Strength to good proximal hip strength and control, within 5lb HHD in LE to allow for proper functional mobility as indicated by patients Functional Deficits. 4. Patient will return to all functional activities without increased symptoms or restriction. 5. Pt will be able to walk w/o deviations or AD for 45 min for functional activities. (patient specific functional goal    Progression Towards Functional goals:  [] Patient is progressing as expected towards functional goals listed. [] Progression is slowed due to complexities listed. [] Progression has been slowed due to co-morbidities. [x] Plan just implemented, too soon to assess goals progression  [] Other:     ASSESSMENT: Min effort needed for ROM. Min soreness after fall on buttock. Quad contraction fair but better after stim. Progressing wts w/ focus on quad contraction and quality of motion. Medication being adjusted for depression so pt did need cuing for some tech's. Mod fatigue from program.     Treatment/Activity Tolerance:  [x] Patient tolerated treatment well [x] Patient limited by fatique  [] Patient limited by pain  [] Patient limited by other medical complications  [] Other:     Prognosis: [x] Good [] Fair  [] Poor    Patient Requires Follow-up: [x] Yes  [] No    PLAN: Cont therapy s/p L knee surg.    [x] Continue per plan of care [] Alter current plan (see comments)  [x] Plan of care initiated [] Hold pending MD visit [] Discharge    Electronically signed by: Dada La PT, Ramesh Brooks 89    Physical Therapist [facility] license #[id number]  Physical Therapist [facility] license #[id number]

## 2017-10-25 ENCOUNTER — TREATMENT (OUTPATIENT)
Dept: PHYSICAL THERAPY | Age: 47
End: 2017-10-25

## 2017-10-25 DIAGNOSIS — M17.12 OSTEOARTHRITIS OF LEFT KNEE, UNSPECIFIED OSTEOARTHRITIS TYPE: ICD-10-CM

## 2017-10-25 DIAGNOSIS — M22.42 CHONDROMALACIA PATELLA, LEFT: ICD-10-CM

## 2017-10-25 DIAGNOSIS — G89.29 CHRONIC PAIN OF LEFT KNEE: ICD-10-CM

## 2017-10-25 DIAGNOSIS — M25.60 DECREASED RANGE OF MOTION: Primary | ICD-10-CM

## 2017-10-25 DIAGNOSIS — M25.562 CHRONIC PAIN OF LEFT KNEE: ICD-10-CM

## 2017-10-25 DIAGNOSIS — R53.1 DECREASED STRENGTH: ICD-10-CM

## 2017-10-25 PROCEDURE — 97014 ELECTRIC STIMULATION THERAPY: CPT | Performed by: PHYSICAL THERAPIST

## 2017-10-25 PROCEDURE — 97016 VASOPNEUMATIC DEVICE THERAPY: CPT | Performed by: PHYSICAL THERAPIST

## 2017-10-25 PROCEDURE — 97530 THERAPEUTIC ACTIVITIES: CPT | Performed by: PHYSICAL THERAPIST

## 2017-10-25 PROCEDURE — 97110 THERAPEUTIC EXERCISES: CPT | Performed by: PHYSICAL THERAPIST

## 2017-10-25 NOTE — FLOWSHEET NOTE
[facility]   Phone: [phone]    Fax: [phone]                                                       Physical Therapy Daily Treatment Note      Date:  10/25/2017    Patient Name:  Cheyenne Martinez    :  1970  MRN: [id number]  Medical/Treatment Diagnosis Information:  Diagnosis: L patella femoral replacement; Z98.890 9/15/17  Treatment Diagnosis: dec ROM, dec strength, knee pain, dec gt skills,   Insurance/Certification information:  PT Insurance Information: Lorri  Physician Information:  Referring Practitioner: Porfirio Snell of care signed (Y/N):     Date of Patient follow up with Physician: today    G-Code (if applicable):      Date G-Code Applied:  17  PT G-Codes  Functional Assessment Tool Used: LEFS  Score: 8/80=10%  Functional Limitation: Mobility: Walking and moving around  Mobility: Walking and Moving Around Current Status (): At least 80 percent but less than 100 percent impaired, limited or restricted  Mobility: Walking and Moving Around Goal Status (): At least 1 percent but less than 20 percent impaired, limited or restricted    Progress Note: [x]  Yes  []  No  Next due by: Visit #10       Latex Allergy:  [x]NO      []YES  Preferred Language for Healthcare:   [x]English       []other:    Visit # Insurance Allowable   11      Pain level:  3/10 w/ percocet     SUBJECTIVE:  Pt states that she is still using percocet and vicoden for knee pain. Steroid almost complete. Pt had questions about walking that were answered     OBJECTIVE: See eval  Observation: min- mod edema L knee; incision healing well w/o signs of infection noted; Quad fair+; Amb w/ cane and dec stride length w/ dec knee ext and limp noted;   Test measurements:      Flexibility L R Comment   Hamstring slight tightness  10/6/17   Gastroc Min tightness     ITB      Quad                ROM PROM AROM Overpressure Comment    L R L R L R 10/25/17   Flexion 135         Extension   0 motor activation to allow for proper function of core, proximal hip and LE with self care and ADLs  [] (31441) Gait Re-education- Provided training and instruction to the patient for proper LE, core and proximal hip recruitment and positioning and eccentric body weight control with ambulation re-education including up and down stairs     Home Exercise Program:    [x] (37033) Reviewed/Progressed HEP activities related to strengthening, flexibility, endurance, ROM of core, proximal hip and LE for functional self-care, mobility, lifting and ambulation/stair navigation 9/18/17 written HEP given 9/29/17 Reviewed  [] (17317)Reviewed/Progressed HEP activities related to improving balance, coordination, kinesthetic sense, posture, motor skill, proprioception of core, proximal hip and LE for self care, mobility, lifting, and ambulation/stair navigation      Manual Treatments:  PROM / STM / Oscillations-Mobs:  G-I, II, III, IV (PA's, Inf., Post.)  [] (42528) Provided manual therapy to mobilize LE, proximal hip and/or LS spine soft tissue/joints for the purpose of modulating pain, promoting relaxation,  increasing ROM, reducing/eliminating soft tissue swelling/inflammation/restriction, improving soft tissue extensibility and allowing for proper ROM for normal function with self care, mobility, lifting and ambulation. Modalities:  VASO x15 min    Charges:  Timed Code Treatment Minutes: 40   Total Treatment Minutes: 60     [] EVAL (LOW) 94166 (typically 20 minutes face-to-face)  [] EVAL (MOD) 18204 (typically 30 minutes face-to-face)  [] EVAL (HIGH) 24673 (typically 45 minutes face-to-face)  [] RE-EVAL   [x] AI(55195) x  2   [] IONTO  [] NMR (16792) x      [x] VASO  [] Manual (42179) x       [] Other:  [x] TA (58710) x  1    [] Mech Traction (50918)  [] ES(attended) (51899)      [x] ES (un) (06393): x15 min      GOALS:  Patient stated goal: wants to get ROM back and walk w/o pain;     Therapist goals for Patient:   Short Term Goals: To be achieved in: 2 weeks  1. Independent in HEP and progression per patient tolerance, in order to prevent re-injury. MET  2. Patient will have a decrease in pain to facilitate improvement in movement, function, and ADLs as indicated by Functional Deficits. Long Term Goals: To be achieved in: 12 weeks  1. Disability index score of 10% or less for the LEFS to assist with reaching prior level of function. 2. Patient will demonstrate increased AROM to 0-130 to allow for proper joint functioning as indicated by patients Functional Deficits. MET but continue to assess   3. Patient will demonstrate an increase in Strength to good proximal hip strength and control, within 5lb HHD in LE to allow for proper functional mobility as indicated by patients Functional Deficits. 4. Patient will return to all functional activities without increased symptoms or restriction. 5. Pt will be able to walk w/o deviations or AD for 45 min for functional activities. (patient specific functional goal) Progressing    Progression Towards Functional goals:  [] Patient is progressing as expected towards functional goals listed. [] Progression is slowed due to complexities listed. [] Progression has been slowed due to co-morbidities. [x] Plan just implemented, too soon to assess goals progression  [] Other:     ASSESSMENT: Quad contraction improving but still delayed. Function progressing. Edema resolving. Gt pattern better w/o AD and more fluid motion noted. Ed given on compliance w/ HEP. Quad is progressing very slowly for improvement in strength.     Treatment/Activity Tolerance:  [x] Patient tolerated treatment well [x] Patient limited by fatique  [] Patient limited by pain  [] Patient limited by other medical complications  [] Other:     Prognosis: [x] Good [] Fair  [] Poor    Patient Requires Follow-up: [x] Yes  [] No    PLAN: Cont therapy s/p L knee surg for inc in strength and function  [x] Continue per plan of care [] Alter current plan (see comments)  [x] Plan of care initiated [] Hold pending MD visit [] Discharge    Electronically signed by: Mavis Izaguirre PT, [place]    Physical Therapist [place] license #[id number]  Physical Therapist [place] license #[id number]

## 2017-10-27 ENCOUNTER — TREATMENT (OUTPATIENT)
Dept: PHYSICAL THERAPY | Age: 47
End: 2017-10-27

## 2017-10-27 DIAGNOSIS — R53.1 DECREASED STRENGTH: ICD-10-CM

## 2017-10-27 DIAGNOSIS — M25.60 DECREASED RANGE OF MOTION: Primary | ICD-10-CM

## 2017-10-27 DIAGNOSIS — M25.562 CHRONIC PAIN OF LEFT KNEE: ICD-10-CM

## 2017-10-27 DIAGNOSIS — M17.12 OSTEOARTHRITIS OF LEFT KNEE, UNSPECIFIED OSTEOARTHRITIS TYPE: ICD-10-CM

## 2017-10-27 DIAGNOSIS — G89.29 CHRONIC PAIN OF LEFT KNEE: ICD-10-CM

## 2017-10-27 DIAGNOSIS — M17.12 PRIMARY OSTEOARTHRITIS OF LEFT KNEE: ICD-10-CM

## 2017-10-27 DIAGNOSIS — M22.42 CHONDROMALACIA PATELLA, LEFT: ICD-10-CM

## 2017-10-27 PROCEDURE — 97016 VASOPNEUMATIC DEVICE THERAPY: CPT | Performed by: PHYSICAL THERAPIST

## 2017-10-27 PROCEDURE — 97530 THERAPEUTIC ACTIVITIES: CPT | Performed by: PHYSICAL THERAPIST

## 2017-10-27 PROCEDURE — 97110 THERAPEUTIC EXERCISES: CPT | Performed by: PHYSICAL THERAPIST

## 2017-10-27 NOTE — FLOWSHEET NOTE
RESTRICTIONS/PRECAUTIONS: prior surgeries in knees in 1990, 2000, 2003    Exercises/Interventions:   Exercise/Equipment Resistance/Repetitions Other comments   Cardio/Warm-up     Bike     Treadmill          Stretching     Hamstring Seated 10\"x10    Hip Flexion     ITB     Grion     Quad     Inclined Calf Standing 10\"x10    Towel Pull          ROM     Passive ERMI 10\"x10    Active     Weight Shift     Weight Hangs     Sheet Pulls     Ankle Pumps           Patellar Glides     Medial     Superior     Inferior          STRENGTH     SLR     SAQ     Supine 4x10 3#    Prone     Abduction 3x10 3#    Adducton PS 10\"x10+ bridging    SLR+          Isometrics     Quad sets          CKC     Calf raises 3x10 10# total    Wall sits x4  30\" w/ PS    Step ups & over L3 x30 UE assistance used int   Step downs     1 leg stand     Squatting Butt bumps x20    CC TKE     Balance      SLS eo 30\"x3 EC, airex    PRE     Extension 15# 4x10  RANGE:   Flexion 25# 4x10 RANGE:   Leg Press 45# L LE 3x10  65# B LE 3 x10 RANGE:        Cable Column          Gt training Heel strike  and QS x5 min; w/o cane today w/ cuing    Manual/Modalities                 Therapeutic Exercise and NMR EXR  [x] (90565) Provided verbal/tactile cueing for activities related to strengthening, flexibility, endurance, ROM for improvements in LE, proximal hip, and core control with self care, mobility, lifting, ambulation.  [] (68208) Provided verbal/tactile cueing for activities related to improving balance, coordination, kinesthetic sense, posture, motor skill, proprioception  to assist with LE, proximal hip, and core control in self care, mobility, lifting, ambulation and eccentric single leg control.      NMR and Therapeutic Activities:    [] (78684 or 90827) Provided verbal/tactile cueing for activities related to improving balance, coordination, kinesthetic sense, posture, motor skill, proprioception and motor activation to allow for proper function of core, Hold pending MD visit [] Discharge    Electronically signed by: Dada La PT, [place]    Physical Therapist [place] license #[id number]  Physical Therapist [facility] license #[id number]

## 2017-11-01 ENCOUNTER — TREATMENT (OUTPATIENT)
Dept: PHYSICAL THERAPY | Age: 47
End: 2017-11-01

## 2017-11-01 ENCOUNTER — OFFICE VISIT (OUTPATIENT)
Dept: ORTHOPEDIC SURGERY | Age: 47
End: 2017-11-01

## 2017-11-01 VITALS
HEIGHT: 64 IN | BODY MASS INDEX: 21.53 KG/M2 | HEART RATE: 87 BPM | DIASTOLIC BLOOD PRESSURE: 75 MMHG | SYSTOLIC BLOOD PRESSURE: 95 MMHG | WEIGHT: 126.1 LBS

## 2017-11-01 DIAGNOSIS — M17.12 OSTEOARTHRITIS OF LEFT KNEE, UNSPECIFIED OSTEOARTHRITIS TYPE: ICD-10-CM

## 2017-11-01 DIAGNOSIS — G89.29 CHRONIC PAIN OF LEFT KNEE: ICD-10-CM

## 2017-11-01 DIAGNOSIS — M25.60 DECREASED RANGE OF MOTION: ICD-10-CM

## 2017-11-01 DIAGNOSIS — M25.462 EFFUSION OF LEFT KNEE: Primary | ICD-10-CM

## 2017-11-01 DIAGNOSIS — M22.42 CHONDROMALACIA PATELLA, LEFT: ICD-10-CM

## 2017-11-01 DIAGNOSIS — Z96.652 STATUS POST LEFT PARTIAL KNEE REPLACEMENT: Primary | ICD-10-CM

## 2017-11-01 DIAGNOSIS — R53.1 DECREASED STRENGTH: ICD-10-CM

## 2017-11-01 DIAGNOSIS — M25.562 CHRONIC PAIN OF LEFT KNEE: ICD-10-CM

## 2017-11-01 DIAGNOSIS — M17.12 PRIMARY OSTEOARTHRITIS OF LEFT KNEE: ICD-10-CM

## 2017-11-01 PROCEDURE — 97016 VASOPNEUMATIC DEVICE THERAPY: CPT | Performed by: PHYSICAL THERAPIST

## 2017-11-01 PROCEDURE — 99024 POSTOP FOLLOW-UP VISIT: CPT | Performed by: ORTHOPAEDIC SURGERY

## 2017-11-01 PROCEDURE — 97110 THERAPEUTIC EXERCISES: CPT | Performed by: PHYSICAL THERAPIST

## 2017-11-01 PROCEDURE — 97530 THERAPEUTIC ACTIVITIES: CPT | Performed by: PHYSICAL THERAPIST

## 2017-11-01 NOTE — PROGRESS NOTES
History of Present Illness:    Fozia Liriano is a 41 y.o. female   . She comes in today status post patellofemoral replacement on her left side approximately 6 weeks ago      Chief Complaint , a summary of previous treatments, injury, and current reason for the visit:    Her pain is only a 3 out of 10. Her major problem is underlying depression, which is becoming worse after her surgery. She is on Risperdal    Examination:    Examination of the left knee shows 1+ effusion. She has full extension and 130 of flexion. Patellofemoral tracking is perfect. There is no instability. There is no pain. There is mild swelling along the medial aspect of the knee, but there is no joint line discomfort        Office Procedures:  No orders of the defined types were placed in this encounter. X-ray interpretation:  Prior x-ray evaluation confirms her knee and excellent alignment    Treatment Plan:  I talked or one of the options would be to try to injection, but I wanted try her on tumeric to see if functional improvement can be obtained    Final impresson and disposition: Total knee replacement, left side. Patellofemoral replacement, doing well. To discuss with her psychiatrist the underlying depression. 2.  Initiate anti-inflammatories                      Amadeo Kilgore. Celina Man M.D. This dictation was performed with a verbal recognition program and it was checked for errors. It is possible that there are still dictated errors within this office note. Any errors should be brought immediately to my attention for correction.   All efforts were made to ensure that this office note is accurate

## 2017-11-01 NOTE — FLOWSHEET NOTE
[facility]   Phone: [phone]    Fax: [phone]                                                       Physical Therapy Daily Treatment Note      Date:  2017    Patient Name:  Conor Ordonez    :  1970  MRN: [id number]  Medical/Treatment Diagnosis Information:  Diagnosis: L patella femoral replacement; Z98.890 9/15/17  Treatment Diagnosis: dec ROM, dec strength, knee pain, dec gt skills,   Insurance/Certification information:  PT Insurance Information: Lorri  Physician Information:  Referring Practitioner: Kelli Barroso of care signed (Y/N):     Date of Patient follow up with Physician: today    G-Code (if applicable):      Date G-Code Applied:  17  PT G-Codes  Functional Assessment Tool Used: LEFS  Score: 880=10%  Functional Limitation: Mobility: Walking and moving around  Mobility: Walking and Moving Around Current Status (): At least 80 percent but less than 100 percent impaired, limited or restricted  Mobility: Walking and Moving Around Goal Status (): At least 1 percent but less than 20 percent impaired, limited or restricted    Progress Note: [x]  Yes  []  No  Next due by: Visit #10       Latex Allergy:  [x]NO      []YES  Preferred Language for Healthcare:   [x]English       []other:    Visit # Insurance Allowable   13      Pain level:  3/10 w/o medication    SUBJECTIVE:  Pt states that her knee is swollen again w/ steroid now finished. Pt performing HEP inconsistent. OBJECTIVE: See eval  Observation: min- mod edema L knee; incision healing well w/o signs of infection noted; Quad fair+; Amb w/ cane and dec stride length w/ dec knee ext and limp noted;   Test measurements:      Flexibility L R Comment   Hamstring slight tightness  10/6/17   Gastroc Min tightness     ITB      Quad                ROM PROM AROM Overpressure Comment    L R L R L R 10/27/17   Flexion 137         Extension   0                             Strength L R Comment   Quad Fair+ Hamstring      Gastroc                    RESTRICTIONS/PRECAUTIONS: prior surgeries in knees in 1990, 2000, 2003    Exercises/Interventions:   Exercise/Equipment Resistance/Repetitions Other comments   Cardio/Warm-up     Bike     Treadmill          Stretching     Hamstring Seated 10\"x10    Hip Flexion     ITB     Grion     Quad     Inclined Calf Standing 10\"x10    Towel Pull          ROM     Passive ERMI 10\"x10    Active     Weight Shift     Weight Hangs     Sheet Pulls     Ankle Pumps           Patellar Glides     Medial     Superior     Inferior          STRENGTH     SLR     SAQ     Supine 4x10 3#    Prone     Abduction 3x10 3#    Adducton PS 10\"x10+ bridging    SLR+          Isometrics     Quad sets          CKC     Calf raises 3x10 10# total    Wall sits x5  30\" w/ PS    Step ups & over L3 x30 UE assistance used int   Step downs     1 leg stand     Squatting Butt bumps x20    CC TKE     Balance      SLS eo 30\"x3 EC, airex    PRE     Extension 15# 4x10  RANGE:   Flexion 25# 4x10 RANGE:   Leg Press 40# L LE 3x10  65# B LE 3 x10 RANGE:        Cable Column          Gt training Heel strike  and QS x5 min; w/o cane today w/ cuing    Manual/Modalities                 Therapeutic Exercise and NMR EXR  [x] (13366) Provided verbal/tactile cueing for activities related to strengthening, flexibility, endurance, ROM for improvements in LE, proximal hip, and core control with self care, mobility, lifting, ambulation.  [] (70898) Provided verbal/tactile cueing for activities related to improving balance, coordination, kinesthetic sense, posture, motor skill, proprioception  to assist with LE, proximal hip, and core control in self care, mobility, lifting, ambulation and eccentric single leg control.      NMR and Therapeutic Activities:    [] (34976 or 36734) Provided verbal/tactile cueing for activities related to improving balance, coordination, kinesthetic sense, posture, motor skill, proprioception and motor activation to allow for proper function of core, proximal hip and LE with self care and ADLs  [] (61194) Gait Re-education- Provided training and instruction to the patient for proper LE, core and proximal hip recruitment and positioning and eccentric body weight control with ambulation re-education including up and down stairs     Home Exercise Program:    [x] (69968) Reviewed/Progressed HEP activities related to strengthening, flexibility, endurance, ROM of core, proximal hip and LE for functional self-care, mobility, lifting and ambulation/stair navigation 9/18/17 written HEP given 9/29/17 Reviewed  [] (92619)Reviewed/Progressed HEP activities related to improving balance, coordination, kinesthetic sense, posture, motor skill, proprioception of core, proximal hip and LE for self care, mobility, lifting, and ambulation/stair navigation      Manual Treatments:  PROM / STM / Oscillations-Mobs:  G-I, II, III, IV (PA's, Inf., Post.)  [] (30228) Provided manual therapy to mobilize LE, proximal hip and/or LS spine soft tissue/joints for the purpose of modulating pain, promoting relaxation,  increasing ROM, reducing/eliminating soft tissue swelling/inflammation/restriction, improving soft tissue extensibility and allowing for proper ROM for normal function with self care, mobility, lifting and ambulation. Modalities:  VASO x15 min    Charges:  Timed Code Treatment Minutes: 40   Total Treatment Minutes: 60     [] EVAL (LOW) 55018 (typically 20 minutes face-to-face)  [] EVAL (MOD) 41011 (typically 30 minutes face-to-face)  [] EVAL (HIGH) 71473 (typically 45 minutes face-to-face)  [] RE-EVAL   [x] ZA(04056) x  2   [] IONTO  [] NMR (51561) x      [x] VASO  [] Manual (35611) x       [] Other:  [x] TA (81296) x  1    [] Mech Traction (22040)  [] ES(attended) (25699)      [] ES (un) (57016): x15 min      GOALS:  Patient stated goal: wants to get ROM back and walk w/o pain; Therapist goals for Patient:   Short Term Goals:  To be achieved in: 2 weeks  1. Independent in HEP and progression per patient tolerance, in order to prevent re-injury. MET  2. Patient will have a decrease in pain to facilitate improvement in movement, function, and ADLs as indicated by Functional Deficits. Long Term Goals: To be achieved in: 12 weeks  1. Disability index score of 10% or less for the LEFS to assist with reaching prior level of function. 2. Patient will demonstrate increased AROM to 0-130 to allow for proper joint functioning as indicated by patients Functional Deficits. MET but continue to assess   3. Patient will demonstrate an increase in Strength to good proximal hip strength and control, within 5lb HHD in LE to allow for proper functional mobility as indicated by patients Functional Deficits. 4. Patient will return to all functional activities without increased symptoms or restriction. Progressing   5. Pt will be able to walk w/o deviations or AD for 45 min for functional activities. (patient specific functional goal) Progressing    Progression Towards Functional goals:  [x] Patient is progressing as expected towards functional goals listed. [] Progression is slowed due to complexities listed. [] Progression has been slowed due to co-morbidities. [x] Plan just implemented, too soon to assess goals progression  [] Other:     ASSESSMENT:  Pt more sore w/ program today despite not progressing program. She has not been compliant w/ HEP at home. Ed spend on HEP and its importance for carry over. Pt is very weak in both LE's. Inc in edema noted w/ completion of steroid dose pack. Treatment/Activity Tolerance:  [x] Patient tolerated treatment well [x] Patient limited by fatique  [] Patient limited by pain  [] Patient limited by other medical complications  [] Other:     Prognosis: [x] Good [] Fair  [] Poor    Patient Requires Follow-up: [x] Yes  [] No    PLAN: Cont therapy s/p L knee surg for inc in strength and function.  Inc wts as

## 2017-11-03 ENCOUNTER — TREATMENT (OUTPATIENT)
Dept: PHYSICAL THERAPY | Age: 47
End: 2017-11-03

## 2017-11-03 DIAGNOSIS — R53.1 DECREASED STRENGTH: ICD-10-CM

## 2017-11-03 DIAGNOSIS — M25.562 CHRONIC PAIN OF LEFT KNEE: ICD-10-CM

## 2017-11-03 DIAGNOSIS — M17.12 OSTEOARTHRITIS OF LEFT KNEE, UNSPECIFIED OSTEOARTHRITIS TYPE: ICD-10-CM

## 2017-11-03 DIAGNOSIS — M25.462 EFFUSION OF LEFT KNEE: Primary | ICD-10-CM

## 2017-11-03 DIAGNOSIS — M25.60 DECREASED RANGE OF MOTION: ICD-10-CM

## 2017-11-03 DIAGNOSIS — G89.29 CHRONIC PAIN OF LEFT KNEE: ICD-10-CM

## 2017-11-03 PROCEDURE — 97016 VASOPNEUMATIC DEVICE THERAPY: CPT | Performed by: PHYSICAL THERAPIST

## 2017-11-03 PROCEDURE — 97110 THERAPEUTIC EXERCISES: CPT | Performed by: PHYSICAL THERAPIST

## 2017-11-03 PROCEDURE — 97530 THERAPEUTIC ACTIVITIES: CPT | Performed by: PHYSICAL THERAPIST

## 2017-11-03 NOTE — FLOWSHEET NOTE
Hamstring      Gastroc                    RESTRICTIONS/PRECAUTIONS: prior surgeries in knees in 1990, 2000, 2003    Exercises/Interventions:   Exercise/Equipment Resistance/Repetitions Other comments   Cardio/Warm-up     Bike     Treadmill          Stretching     Hamstring Seated 10\"x10    Hip Flexion     ITB     Grion     Quad     Inclined Calf Standing 10\"x10    Towel Pull          ROM     Passive ERMI 10\"x10    Active     Weight Shift     Weight Hangs     Sheet Pulls     Ankle Pumps           Patellar Glides     Medial     Superior     Inferior          STRENGTH     SLR     SAQ     Supine 4x10 3#    Prone     Abduction 3x10 3#    Adducton PS 10\"x10+ bridging    SLR+          Isometrics     Quad sets          CKC     Calf raises 3x10 10# total; 1x10 single    Wall sits x5  30\" w/ PS    Step ups & over L3 x30 UE assistance used int   Step downs     1 leg stand     Squatting Butt bumps x30 R heel lifted    CC TKE     Balance      SLS eo 30\"x3 EC, airex    PRE     Extension 15# 4x10  RANGE:   Flexion 30# 1x10, 25# 2x10 RANGE:   Leg Press 45# L LE 3x10  65# B LE 3 x10 RANGE:        Cable Column          Gt training     Manual/Modalities                 Therapeutic Exercise and NMR EXR  [x] (45438) Provided verbal/tactile cueing for activities related to strengthening, flexibility, endurance, ROM for improvements in LE, proximal hip, and core control with self care, mobility, lifting, ambulation.  [] (18172) Provided verbal/tactile cueing for activities related to improving balance, coordination, kinesthetic sense, posture, motor skill, proprioception  to assist with LE, proximal hip, and core control in self care, mobility, lifting, ambulation and eccentric single leg control.      NMR and Therapeutic Activities:    [] (90581 or 77538) Provided verbal/tactile cueing for activities related to improving balance, coordination, kinesthetic sense, posture, motor skill, proprioception and motor activation to allow for weeks  1. Independent in HEP and progression per patient tolerance, in order to prevent re-injury. MET  2. Patient will have a decrease in pain to facilitate improvement in movement, function, and ADLs as indicated by Functional Deficits. Long Term Goals: To be achieved in: 12 weeks  1. Disability index score of 10% or less for the LEFS to assist with reaching prior level of function. 2. Patient will demonstrate increased AROM to 0-130 to allow for proper joint functioning as indicated by patients Functional Deficits. MET but continue to assess   3. Patient will demonstrate an increase in Strength to good proximal hip strength and control, within 5lb HHD in LE to allow for proper functional mobility as indicated by patients Functional Deficits. 4. Patient will return to all functional activities without increased symptoms or restriction. Progressing   5. Pt will be able to walk w/o deviations or AD for 45 min for functional activities. (patient specific functional goal) Progressing    Progression Towards Functional goals:  [x] Patient is progressing as expected towards functional goals listed. [] Progression is slowed due to complexities listed. [] Progression has been slowed due to co-morbidities. [] Plan just implemented, too soon to assess goals progression  [] Other:     ASSESSMENT:  ROM goals reached easily cold w/o stretching. Edema still noted in L knee jt. Strength is very limited. Pt able to progress strength training for some of the program. Reemphasized importance of compliance w/ HEP. Treatment/Activity Tolerance:  [x] Patient tolerated treatment well [x] Patient limited by fatique  [] Patient limited by pain  [] Patient limited by other medical complications  [] Other:     Prognosis: [x] Good [] Fair  [] Poor    Patient Requires Follow-up: [x] Yes  [] No    PLAN: Cont therapy s/p L knee surg for inc in strength and function. Inc wts as able.   [x] Continue per plan of care [] Alter current plan (see comments)  [x] Plan of care initiated [] Hold pending MD visit [] Discharge    Electronically signed by: James Hewitt PT, Ramesh Brooks 89    Physical Therapist [facility] license #[id number]  Physical Therapist [place] license #[id number]

## 2017-11-08 ENCOUNTER — TREATMENT (OUTPATIENT)
Dept: PHYSICAL THERAPY | Age: 47
End: 2017-11-08

## 2017-11-08 DIAGNOSIS — M25.462 EFFUSION OF LEFT KNEE: Primary | ICD-10-CM

## 2017-11-08 DIAGNOSIS — G89.29 CHRONIC PAIN OF LEFT KNEE: ICD-10-CM

## 2017-11-08 DIAGNOSIS — M25.562 CHRONIC PAIN OF LEFT KNEE: ICD-10-CM

## 2017-11-08 DIAGNOSIS — M25.60 DECREASED RANGE OF MOTION: ICD-10-CM

## 2017-11-08 DIAGNOSIS — M17.12 OSTEOARTHRITIS OF LEFT KNEE, UNSPECIFIED OSTEOARTHRITIS TYPE: ICD-10-CM

## 2017-11-08 DIAGNOSIS — R53.1 DECREASED STRENGTH: ICD-10-CM

## 2017-11-08 DIAGNOSIS — M22.42 CHONDROMALACIA PATELLA, LEFT: ICD-10-CM

## 2017-11-08 PROCEDURE — 97110 THERAPEUTIC EXERCISES: CPT | Performed by: PHYSICAL THERAPIST

## 2017-11-08 PROCEDURE — 97016 VASOPNEUMATIC DEVICE THERAPY: CPT | Performed by: PHYSICAL THERAPIST

## 2017-11-08 PROCEDURE — 97530 THERAPEUTIC ACTIVITIES: CPT | Performed by: PHYSICAL THERAPIST

## 2017-11-08 NOTE — FLOWSHEET NOTE
[facility]   Phone: [phone]    Fax: [phone]                                                       Physical Therapy Daily Treatment Note/POC   Physical Therapy Re-Certification Plan of Care    Dear  ,    We had the pleasure of treating the following patient for physical therapy services at [address] [place] [address]. A summary of our findings can be found in the updated assessment below. This includes our plan of care. If you have any questions or concerns regarding these findings, please do not hesitate to contact me at the office phone number checked above. Thank you for the referral.     Physician Signature:________________________________Date:__________________  By signing above (or electronic signature), therapists plan is approved by physician      Overall Response to Treatment:   [x]Patient is responding well to treatment and improvement is noted with regards  to goals   []Patient should continue to improve in reasonable time if they continue HEP   []Patient has plateaued and is no longer responding to skilled PT intervention    []Patient is getting worse and would benefit from return to referring MD   []Patient unable to adhere to initial POC   [x]Other: Pt is making slow progress due to min compliance w/ HEP at home. Fluctuation in edema depending on activity. Pt is still very weak w/ atrophy bilat in LE's but function is gradually improving.       Date:  2017    Patient Name:  Stephon Plasencia    :  1970  MRN: [id number]  Medical/Treatment Diagnosis Information:  Diagnosis: L patella femoral replacement; Z98.890 9/15/17  Treatment Diagnosis: dec ROM, dec strength, knee pain, dec gt skills,   Insurance/Certification information:  PT Insurance Information: [employer]  Physician Information:  Referring Practitioner: [employer] [facility] signed (Y/N):     Date of Patient follow up with Physician: today    G-Code (if applicable):      Date G-Code Applied: 9/18/17  PT G-Codes  Functional Assessment Tool Used: LEFS  Score: 8/80=10%  Functional Limitation: Mobility: Walking and moving around  Mobility: Walking and Moving Around Current Status (): At least 80 percent but less than 100 percent impaired, limited or restricted  Mobility: Walking and Moving Around Goal Status (): At least 1 percent but less than 20 percent impaired, limited or restricted    11/8/17 LEFS 43/80=53.5 (46.5% disability)    Progress Note: [x]  Yes  []  No  Next due by: Visit #10       Latex Allergy:  [x]NO      []YES  Preferred Language for Healthcare:   [x]English       []other:    Visit # Insurance Allowable   15      Pain level:  3/10 w/o medication    SUBJECTIVE:  Pt states that her dog brushed against her leg at home w/ pain. She then ran through the house away from dog. Edema still noted in L knee. OBJECTIVE: See eval  Observation: min- mod edema L knee; incision healing well w/o signs of infection noted; Quad fair+; Amb w/ symmetry in stride length and reciprocal arm swing w/o AD but int limp noted;   Test measurements:      Flexibility L R Comment   Hamstring slight tightness  11/3/17   Gastroc Min tightness     ITB      Quad                ROM PROM AROM Overpressure Comment    L R L R L R 11/8/17   Flexion 137         Extension   0                             Strength L R Comment   Quad good     Hamstring      Gastroc                    RESTRICTIONS/PRECAUTIONS: prior surgeries in knees in 1990, 2000, 2003    Exercises/Interventions:   Exercise/Equipment Resistance/Repetitions Other comments   Cardio/Warm-up     Bike     Treadmill          Stretching     Hamstring Seated 10\"x10    Hip Flexion     ITB     Grion     Quad     Inclined Calf Standing 10\"x10    Towel Pull          ROM     Passive ERMI 10\"x10    Active     Weight Shift     Weight Hangs     Sheet Pulls     Ankle Pumps           Patellar Glides     Medial     Superior     Inferior          STRENGTH     SLR     SAQ Supine 3x10 4#    Prone     Abduction 3x10 4#    Adducton PS 10\"x10+ bridging    SLR+          Isometrics     Quad sets          CKC     Calf raises 3x10 10# total; 1x10 single    Wall sits 1'x2, 30\"x1 w/ PS    Step ups & over L3 x30 UE assistance used int   Step downs     1 leg stand     Squatting Butt bumps x30 R heel lifted    CC TKE     Balance         biodex L9 LOS x2         PRE     Extension 25# 3x10  RANGE:   Flexion  25# 4x10 RANGE:   Leg Press   65# B LE 3 x10 RANGE:        Cable Column          Gt training     Manual/Modalities                 Therapeutic Exercise and NMR EXR  [x] (15925) Provided verbal/tactile cueing for activities related to strengthening, flexibility, endurance, ROM for improvements in LE, proximal hip, and core control with self care, mobility, lifting, ambulation.  [] (04839) Provided verbal/tactile cueing for activities related to improving balance, coordination, kinesthetic sense, posture, motor skill, proprioception  to assist with LE, proximal hip, and core control in self care, mobility, lifting, ambulation and eccentric single leg control.      NMR and Therapeutic Activities:    [] (64490 or 62793) Provided verbal/tactile cueing for activities related to improving balance, coordination, kinesthetic sense, posture, motor skill, proprioception and motor activation to allow for proper function of core, proximal hip and LE with self care and ADLs  [] (05030) Gait Re-education- Provided training and instruction to the patient for proper LE, core and proximal hip recruitment and positioning and eccentric body weight control with ambulation re-education including up and down stairs     Home Exercise Program:    [x] (24831) Reviewed/Progressed HEP activities related to strengthening, flexibility, endurance, ROM of core, proximal hip and LE for functional self-care, mobility, lifting and ambulation/stair navigation 9/18/17 written HEP given 9/29/17 Reviewed  [] (10399)Reviewed/Progressed HEP activities related to improving balance, coordination, kinesthetic sense, posture, motor skill, proprioception of core, proximal hip and LE for self care, mobility, lifting, and ambulation/stair navigation      Manual Treatments:  PROM / STM / Oscillations-Mobs:  G-I, II, III, IV (PA's, Inf., Post.)  [] (31984) Provided manual therapy to mobilize LE, proximal hip and/or LS spine soft tissue/joints for the purpose of modulating pain, promoting relaxation,  increasing ROM, reducing/eliminating soft tissue swelling/inflammation/restriction, improving soft tissue extensibility and allowing for proper ROM for normal function with self care, mobility, lifting and ambulation. Modalities:  VASO x15 min    Charges:  Timed Code Treatment Minutes: 40   Total Treatment Minutes: 60     [] EVAL (LOW) 83624 (typically 20 minutes face-to-face)  [] EVAL (MOD) 96647 (typically 30 minutes face-to-face)  [] EVAL (HIGH) 23082 (typically 45 minutes face-to-face)  [] RE-EVAL   [x] EP(55938) x  2   [] IONTO  [] NMR (73988) x      [x] VASO  [] Manual (51067) x       [] Other:  [x] TA (46198) x  1    [] Mech Traction (32042)  [] ES(attended) (23963)      [] ES (un) (94907): x15 min      GOALS:  Patient stated goal: wants to get ROM back and walk w/o pain; Therapist goals for Patient:   Short Term Goals: To be achieved in: 2 weeks  1. Independent in HEP and progression per patient tolerance, in order to prevent re-injury. MET  2. Patient will have a decrease in pain to facilitate improvement in movement, function, and ADLs as indicated by Functional Deficits. Long Term Goals: To be achieved in: 12 weeks  1. Disability index score of 10% or less for the LEFS to assist with reaching prior level of function. 2. Patient will demonstrate increased AROM to 0-130 to allow for proper joint functioning as indicated by patients Functional Deficits. MET but continue to assess   3.  Patient will demonstrate an increase in Strength to good proximal hip strength and control, within 5lb HHD in LE to allow for proper functional mobility as indicated by patients Functional Deficits. 4. Patient will return to all functional activities without increased symptoms or restriction. Progressing   5. Pt will be able to walk w/o deviations or AD for 45 min for functional activities. (patient specific functional goal) Progressing    Progression Towards Functional goals:  [x] Patient is progressing as expected towards functional goals listed. [] Progression is slowed due to complexities listed. [] Progression has been slowed due to co-morbidities. [] Plan just implemented, too soon to assess goals progression  [] Other:     ASSESSMENT:  ROM remains good in L knee. Pt was able to inc wts for SLR but then started having shakes due to not eating anything all day. Therapy had to be adjusted due to pt not able to perform at previous wt levels. Pt is making some improvements in function. Pt does fatigue quickly from program. Slow progress toward goals due to non compliance w/ HEP. Treatment/Activity Tolerance:  [x] Patient tolerated treatment well [x] Patient limited by fatique  [] Patient limited by pain  [] Patient limited by other medical complications  [] Other:     Prognosis: [x] Good [] Fair  [] Poor    Patient Requires Follow-up: [x] Yes  [] No    PLAN: Cont therapy s/p L knee surg for inc in strength and function. Inc wts as able.   [x] Continue per plan of care [] Alter current plan (see comments)  [x] Plan of care initiated [] Hold pending MD visit [] Discharge    Electronically signed by: Daniel Brady PT, Ramesh Brooks 89    Physical Therapist [place] license #[id number]  Physical Therapist [place] license #[id number]

## 2017-11-10 ENCOUNTER — TREATMENT (OUTPATIENT)
Dept: PHYSICAL THERAPY | Age: 47
End: 2017-11-10

## 2017-11-10 DIAGNOSIS — M17.12 OSTEOARTHRITIS OF LEFT KNEE, UNSPECIFIED OSTEOARTHRITIS TYPE: ICD-10-CM

## 2017-11-10 DIAGNOSIS — G89.29 CHRONIC PAIN OF LEFT KNEE: ICD-10-CM

## 2017-11-10 DIAGNOSIS — M25.60 DECREASED RANGE OF MOTION: ICD-10-CM

## 2017-11-10 DIAGNOSIS — M25.562 CHRONIC PAIN OF LEFT KNEE: ICD-10-CM

## 2017-11-10 DIAGNOSIS — R53.1 DECREASED STRENGTH: ICD-10-CM

## 2017-11-10 DIAGNOSIS — M25.462 EFFUSION OF LEFT KNEE: Primary | ICD-10-CM

## 2017-11-10 PROCEDURE — 97110 THERAPEUTIC EXERCISES: CPT | Performed by: PHYSICAL THERAPIST

## 2017-11-10 PROCEDURE — 97530 THERAPEUTIC ACTIVITIES: CPT | Performed by: PHYSICAL THERAPIST

## 2017-11-10 PROCEDURE — 97016 VASOPNEUMATIC DEVICE THERAPY: CPT | Performed by: PHYSICAL THERAPIST

## 2017-11-10 NOTE — FLOWSHEET NOTE
Strength L R Comment   Quad good     Hamstring      Gastroc                    RESTRICTIONS/PRECAUTIONS: prior surgeries in knees in 1990, 2000, 2003    Exercises/Interventions:   Exercise/Equipment Resistance/Repetitions Other comments   Cardio/Warm-up     Bike     Treadmill          Stretching     Hamstring Seated 10\"x10    Hip Flexion     ITB     Grion     Quad     Inclined Calf Standing 10\"x10    Towel Pull          ROM     Passive ERMI 10\"x10    Active     Weight Shift     Weight Hangs     Sheet Pulls     Ankle Pumps           Patellar Glides     Medial     Superior     Inferior          STRENGTH     SLR     SAQ     Supine 3x10 4#    Prone     Abduction 3x10 4#    Adducton PS 10\"x10+ bridging    SLR+          Isometrics     Quad sets          CKC     Calf raises 3x10 10# total; 2x10 single    Wall sits 1'x3 w/ PS    Step ups & over L3 x30 UE assistance used int   Lat steps L3 x20    Step downs     1 leg stand     Squatting Butt bumps x20 R heel lifted    CC TKE     Balance         biodex L8 LOS x2         PRE     Extension 25# 3x10  RANGE:   Flexion  25# 4x10 RANGE:   Leg Press 45# L LE 3x10  65# B LE 3 x10 RANGE:        Cable Column          Gt training     Manual/Modalities                 Therapeutic Exercise and NMR EXR  [x] (21193) Provided verbal/tactile cueing for activities related to strengthening, flexibility, endurance, ROM for improvements in LE, proximal hip, and core control with self care, mobility, lifting, ambulation.  [] (82827) Provided verbal/tactile cueing for activities related to improving balance, coordination, kinesthetic sense, posture, motor skill, proprioception  to assist with LE, proximal hip, and core control in self care, mobility, lifting, ambulation and eccentric single leg control.      NMR and Therapeutic Activities:    [] (46835 or 22242) Provided verbal/tactile cueing for activities related to improving balance, coordination, kinesthetic sense, posture, motor skill, proprioception and motor activation to allow for proper function of core, proximal hip and LE with self care and ADLs  [] (70074) Gait Re-education- Provided training and instruction to the patient for proper LE, core and proximal hip recruitment and positioning and eccentric body weight control with ambulation re-education including up and down stairs     Home Exercise Program:    [x] (08451) Reviewed/Progressed HEP activities related to strengthening, flexibility, endurance, ROM of core, proximal hip and LE for functional self-care, mobility, lifting and ambulation/stair navigation 9/18/17 written HEP given 11/10/17 Reviewed  [] (61048)Reviewed/Progressed HEP activities related to improving balance, coordination, kinesthetic sense, posture, motor skill, proprioception of core, proximal hip and LE for self care, mobility, lifting, and ambulation/stair navigation      Manual Treatments:  PROM / STM / Oscillations-Mobs:  G-I, II, III, IV (PA's, Inf., Post.)  [] (77656) Provided manual therapy to mobilize LE, proximal hip and/or LS spine soft tissue/joints for the purpose of modulating pain, promoting relaxation,  increasing ROM, reducing/eliminating soft tissue swelling/inflammation/restriction, improving soft tissue extensibility and allowing for proper ROM for normal function with self care, mobility, lifting and ambulation. Modalities:  VASO x15 min    Charges:  Timed Code Treatment Minutes: 40   Total Treatment Minutes: 60     [] EVAL (LOW) 52416 (typically 20 minutes face-to-face)  [] EVAL (MOD) 17761 (typically 30 minutes face-to-face)  [] EVAL (HIGH) 36305 (typically 45 minutes face-to-face)  [] RE-EVAL   [x] AX(90112) x  2   [] IONTO  [] NMR (75017) x      [x] VASO  [] Manual (70458) x       [] Other:  [x] TA (84122) x  1    [] Mech Traction (54719)  [] ES(attended) (60943)      [] ES (un) (41324): x15 min      GOALS:  Patient stated goal: wants to get ROM back and walk w/o pain;     Therapist goals for Patient:   Short Term Goals: To be achieved in: 2 weeks  1. Independent in HEP and progression per patient tolerance, in order to prevent re-injury. MET  2. Patient will have a decrease in pain to facilitate improvement in movement, function, and ADLs as indicated by Functional Deficits. Long Term Goals: To be achieved in: 12 weeks  1. Disability index score of 10% or less for the LEFS to assist with reaching prior level of function. 2. Patient will demonstrate increased AROM to 0-130 to allow for proper joint functioning as indicated by patients Functional Deficits. MET but continue to assess   3. Patient will demonstrate an increase in Strength to good proximal hip strength and control, within 5lb HHD in LE to allow for proper functional mobility as indicated by patients Functional Deficits. 4. Patient will return to all functional activities without increased symptoms or restriction. Progressing   5. Pt will be able to walk w/o deviations or AD for 45 min for functional activities. (patient specific functional goal) Progressing    Progression Towards Functional goals:  [x] Patient is progressing as expected towards functional goals listed. [] Progression is slowed due to complexities listed. [] Progression has been slowed due to co-morbidities. [] Plan just implemented, too soon to assess goals progression  [] Other:     ASSESSMENT:  Pt needed encouragement to complete full program. She did have difficulty when performing step backs for muscles contraction. Pt does fatigue w/ current program. Edema in L knee showing some resolution. Function progressing. Pt is making good improvements toward goals.      Treatment/Activity Tolerance:  [x] Patient tolerated treatment well [x] Patient limited by fatique  [] Patient limited by pain  [] Patient limited by other medical complications  [] Other:     Prognosis: [x] Good [] Fair  [] Poor    Patient Requires Follow-up: [x] Yes  [] No    PLAN: Cont

## 2017-11-15 ENCOUNTER — TREATMENT (OUTPATIENT)
Dept: PHYSICAL THERAPY | Age: 47
End: 2017-11-15

## 2017-11-15 DIAGNOSIS — G89.29 CHRONIC PAIN OF LEFT KNEE: ICD-10-CM

## 2017-11-15 DIAGNOSIS — M25.60 DECREASED RANGE OF MOTION: ICD-10-CM

## 2017-11-15 DIAGNOSIS — M25.462 EFFUSION OF LEFT KNEE: Primary | ICD-10-CM

## 2017-11-15 DIAGNOSIS — M22.42 CHONDROMALACIA PATELLA, LEFT: ICD-10-CM

## 2017-11-15 DIAGNOSIS — R53.1 DECREASED STRENGTH: ICD-10-CM

## 2017-11-15 DIAGNOSIS — M25.562 CHRONIC PAIN OF LEFT KNEE: ICD-10-CM

## 2017-11-15 DIAGNOSIS — M17.12 PRIMARY OSTEOARTHRITIS OF LEFT KNEE: ICD-10-CM

## 2017-11-15 DIAGNOSIS — M17.12 OSTEOARTHRITIS OF LEFT KNEE, UNSPECIFIED OSTEOARTHRITIS TYPE: ICD-10-CM

## 2017-11-15 PROCEDURE — 97530 THERAPEUTIC ACTIVITIES: CPT | Performed by: PHYSICAL THERAPIST

## 2017-11-15 PROCEDURE — 97110 THERAPEUTIC EXERCISES: CPT | Performed by: PHYSICAL THERAPIST

## 2017-11-15 PROCEDURE — 97016 VASOPNEUMATIC DEVICE THERAPY: CPT | Performed by: PHYSICAL THERAPIST

## 2017-11-15 NOTE — FLOWSHEET NOTE
Flexion 137         Extension   0                             Strength L R Comment   Quad good     Hamstring      Gastroc                    RESTRICTIONS/PRECAUTIONS: prior surgeries in knees in 1990, 2000, 2003    Exercises/Interventions:   Exercise/Equipment Resistance/Repetitions Other comments   Cardio/Warm-up     Bike     Treadmill          Stretching     Hamstring Seated 10\"x10    Hip Flexion     ITB     Grion     Quad     Inclined Calf Standing 10\"x10    Towel Pull          ROM     Passive ERMI 10\"x10    Active     Weight Shift     Weight Hangs     Sheet Pulls     Ankle Pumps           Patellar Glides     Medial     Superior     Inferior          STRENGTH     SLR     SAQ     Supine 3x10 4#    Prone     Abduction 3x10 4#    Adducton PS 10\"x10+ bridging Single NPV   SLR+          Isometrics     Quad sets          CKC     Calf raises 3x10 10# total; 2x10 single    Wall sits 1'x3 w/ PS    Step ups & over L3 x30 UE assistance used int   Lat steps L3 x20    Step downs     1 leg stand     Squatting Butt bumps x30 R heel lifted    CC TKE     Balance         biodex L8 LOS x2         PRE     Extension 25# 3x10  RANGE:   Flexion  30# 4x10 RANGE:   Leg Press 45# L LE 3x10  65# B LE 3 x10 RANGE:        Cable Column          Gt training     Manual/Modalities                 Therapeutic Exercise and NMR EXR  [x] (24741) Provided verbal/tactile cueing for activities related to strengthening, flexibility, endurance, ROM for improvements in LE, proximal hip, and core control with self care, mobility, lifting, ambulation.  [] (21625) Provided verbal/tactile cueing for activities related to improving balance, coordination, kinesthetic sense, posture, motor skill, proprioception  to assist with LE, proximal hip, and core control in self care, mobility, lifting, ambulation and eccentric single leg control.      NMR and Therapeutic Activities:    [] (79351 or ) Provided verbal/tactile cueing for activities related to stated goal: wants to get ROM back and walk w/o pain; Therapist goals for Patient:   Short Term Goals: To be achieved in: 2 weeks  1. Independent in HEP and progression per patient tolerance, in order to prevent re-injury. MET  2. Patient will have a decrease in pain to facilitate improvement in movement, function, and ADLs as indicated by Functional Deficits. Long Term Goals: To be achieved in: 12 weeks  1. Disability index score of 10% or less for the LEFS to assist with reaching prior level of function. 2. Patient will demonstrate increased AROM to 0-130 to allow for proper joint functioning as indicated by patients Functional Deficits. MET but continue to assess   3. Patient will demonstrate an increase in Strength to good proximal hip strength and control, within 5lb HHD in LE to allow for proper functional mobility as indicated by patients Functional Deficits. 4. Patient will return to all functional activities without increased symptoms or restriction. Progressing   5. Pt will be able to walk w/o deviations or AD for 45 min for functional activities. (patient specific functional goal) Progressing    Progression Towards Functional goals:  [x] Patient is progressing as expected towards functional goals listed. [] Progression is slowed due to complexities listed. [] Progression has been slowed due to co-morbidities. [] Plan just implemented, too soon to assess goals progression  [] Other:     ASSESSMENT:  Pt needed encouragement to work until fatigue. Her ROM is good w/ min effort. Strength is slowly progressing w/ mod encouragement to inc wts and work to fatigue for strength gains. Limp noted int w/ program. Edema in L knee region unchanged.     Treatment/Activity Tolerance:  [x] Patient tolerated treatment well [x] Patient limited by fatique  [] Patient limited by pain  [] Patient limited by other medical complications  [] Other:     Prognosis: [x] Good [] Fair  [] Poor    Patient Requires

## 2017-11-17 ENCOUNTER — TREATMENT (OUTPATIENT)
Dept: PHYSICAL THERAPY | Age: 47
End: 2017-11-17

## 2017-11-17 DIAGNOSIS — R53.1 DECREASED STRENGTH: ICD-10-CM

## 2017-11-17 DIAGNOSIS — M25.60 DECREASED RANGE OF MOTION: ICD-10-CM

## 2017-11-17 DIAGNOSIS — M17.12 OSTEOARTHRITIS OF LEFT KNEE, UNSPECIFIED OSTEOARTHRITIS TYPE: ICD-10-CM

## 2017-11-17 DIAGNOSIS — M25.462 EFFUSION OF LEFT KNEE: Primary | ICD-10-CM

## 2017-11-17 DIAGNOSIS — M22.42 CHONDROMALACIA PATELLA, LEFT: ICD-10-CM

## 2017-11-17 DIAGNOSIS — G89.29 CHRONIC PAIN OF LEFT KNEE: ICD-10-CM

## 2017-11-17 DIAGNOSIS — M25.562 CHRONIC PAIN OF LEFT KNEE: ICD-10-CM

## 2017-11-17 PROCEDURE — 97530 THERAPEUTIC ACTIVITIES: CPT | Performed by: PHYSICAL THERAPIST

## 2017-11-17 PROCEDURE — 97110 THERAPEUTIC EXERCISES: CPT | Performed by: PHYSICAL THERAPIST

## 2017-11-17 PROCEDURE — 97016 VASOPNEUMATIC DEVICE THERAPY: CPT | Performed by: PHYSICAL THERAPIST

## 2017-11-17 NOTE — FLOWSHEET NOTE
R L R L R 11/8/17   Flexion 137         Extension   0                             Strength L R Comment   Quad good     Hamstring      Gastroc                    RESTRICTIONS/PRECAUTIONS: prior surgeries in knees in 1990, 2000, 2003    Exercises/Interventions:   Exercise/Equipment Resistance/Repetitions Other comments   Cardio/Warm-up     Bike     Treadmill          Stretching     Hamstring Seated 10\"x10    Hip Flexion     ITB     Grion     Quad     Inclined Calf Standing 10\"x10    Towel Pull          ROM     Passive ERMI 10\"x10    Active     Weight Shift     Weight Hangs     Sheet Pulls     Ankle Pumps           Patellar Glides     Medial     Superior     Inferior          STRENGTH     SLR     SAQ     Supine 3x10 5#    Prone     Abduction 3x10 5#    Adducton PS 10\"x10+ bridging Single NPV   SLR+          Isometrics     Quad sets          CKC     Calf raises 3x10 10# total; 2x10 single    Wall sits 1'x4 w/ PS    Step ups & over L3 x30 UE assistance used int   Lat steps     Step downs     1 leg stand     Squatting Butt bumps x30 R heel lifted    CC TKE     Balance              Cups reaching 4 cups x5 Alternate UE   PRE     Extension 25# 3x10  RANGE:   Flexion 30# 4x10 RANGE:   Leg Press 45# L LE 3x10  70# B LE 3 x10 RANGE:        Cable Column          Gt training     Manual/Modalities                 Therapeutic Exercise and NMR EXR  [x] (73098) Provided verbal/tactile cueing for activities related to strengthening, flexibility, endurance, ROM for improvements in LE, proximal hip, and core control with self care, mobility, lifting, ambulation.  [] (57267) Provided verbal/tactile cueing for activities related to improving balance, coordination, kinesthetic sense, posture, motor skill, proprioception  to assist with LE, proximal hip, and core control in self care, mobility, lifting, ambulation and eccentric single leg control.      NMR and Therapeutic Activities:    [] (73462 or 71524) Provided verbal/tactile cueing for min      GOALS:  Patient stated goal: wants to get ROM back and walk w/o pain; Therapist goals for Patient:   Short Term Goals: To be achieved in: 2 weeks  1. Independent in HEP and progression per patient tolerance, in order to prevent re-injury. MET  2. Patient will have a decrease in pain to facilitate improvement in movement, function, and ADLs as indicated by Functional Deficits. Long Term Goals: To be achieved in: 12 weeks  1. Disability index score of 10% or less for the LEFS to assist with reaching prior level of function. 2. Patient will demonstrate increased AROM to 0-130 to allow for proper joint functioning as indicated by patients Functional Deficits. MET but continue to assess   3. Patient will demonstrate an increase in Strength to good proximal hip strength and control, within 5lb HHD in LE to allow for proper functional mobility as indicated by patients Functional Deficits. 4. Patient will return to all functional activities without increased symptoms or restriction. Progressing   5. Pt will be able to walk w/o deviations or AD for 45 min for functional activities. (patient specific functional goal) Progressing    Progression Towards Functional goals:  [x] Patient is progressing as expected towards functional goals listed. [] Progression is slowed due to complexities listed. [] Progression has been slowed due to co-morbidities. [] Plan just implemented, too soon to assess goals progression  [] Other:     ASSESSMENT:  Pt was able to push herself harder w/ weights today. Strength is progressing slowly. Edema remaining the same in L knee. Walking pattern improving w/ inc stride length and alisha. Endurance progressing for inc function.      Treatment/Activity Tolerance:  [x] Patient tolerated treatment well [x] Patient limited by fatique  [] Patient limited by pain  [] Patient limited by other medical complications  [] Other:     Prognosis: [x] Good [] Fair  [] Poor    Patient Requires Follow-up: [x] Yes  [] No    PLAN: Cont therapy s/p L knee surg for inc in strength and function. Inc wts as able.   [x] Continue per plan of care [] Alter current plan (see comments)  [x] Plan of care initiated [] Hold pending MD visit [] Discharge    Electronically signed by: Katja Knee PT, Ramesh Brooks 89    Physical Therapist [facility] license #[id number]  Physical Therapist [address] [place], [place] [address] license #[id number]

## 2017-11-20 ENCOUNTER — TREATMENT (OUTPATIENT)
Dept: PHYSICAL THERAPY | Age: 47
End: 2017-11-20

## 2017-11-20 DIAGNOSIS — M17.12 OSTEOARTHRITIS OF LEFT KNEE, UNSPECIFIED OSTEOARTHRITIS TYPE: ICD-10-CM

## 2017-11-20 DIAGNOSIS — M22.42 CHONDROMALACIA PATELLA, LEFT: ICD-10-CM

## 2017-11-20 DIAGNOSIS — M25.462 EFFUSION OF LEFT KNEE: Primary | ICD-10-CM

## 2017-11-20 DIAGNOSIS — M17.12 PRIMARY OSTEOARTHRITIS OF LEFT KNEE: ICD-10-CM

## 2017-11-20 DIAGNOSIS — G89.29 CHRONIC PAIN OF LEFT KNEE: ICD-10-CM

## 2017-11-20 DIAGNOSIS — R53.1 DECREASED STRENGTH: ICD-10-CM

## 2017-11-20 DIAGNOSIS — M25.562 CHRONIC PAIN OF LEFT KNEE: ICD-10-CM

## 2017-11-20 DIAGNOSIS — M25.60 DECREASED RANGE OF MOTION: ICD-10-CM

## 2017-11-20 PROCEDURE — 97016 VASOPNEUMATIC DEVICE THERAPY: CPT | Performed by: PHYSICAL THERAPIST

## 2017-11-20 PROCEDURE — 97530 THERAPEUTIC ACTIVITIES: CPT | Performed by: PHYSICAL THERAPIST

## 2017-11-20 PROCEDURE — 97110 THERAPEUTIC EXERCISES: CPT | Performed by: PHYSICAL THERAPIST

## 2017-11-20 NOTE — FLOWSHEET NOTE
[facility]   Phone: [phone]    Fax: [phone]                                                       Physical Therapy Daily Treatment Note      Date:  2017    Patient Name:  Fallon Nuñez    :  1970  MRN: [id number]  Medical/Treatment Diagnosis Information:  Diagnosis: L patella femoral replacement; Z98.890 9/15/17  Treatment Diagnosis: dec ROM, dec strength, knee pain, dec gt skills,   Insurance/Certification information:  PT Insurance Information: Lorri  Physician Information:  Referring Practitioner: Jose Woo of care signed (Y/N):     Date of Patient follow up with Physician: today    G-Code (if applicable):      Date G-Code Applied:  17  PT G-Codes  Functional Assessment Tool Used: LEFS  Score: 80=10%  Functional Limitation: Mobility: Walking and moving around  Mobility: Walking and Moving Around Current Status (): At least 80 percent but less than 100 percent impaired, limited or restricted  Mobility: Walking and Moving Around Goal Status (): At least 1 percent but less than 20 percent impaired, limited or restricted    17 LEFS 43/80=53.5 (46.5% disability)    Progress Note: [x]  Yes  []  No  Next due by: Visit #10       Latex Allergy:  [x]NO      []YES  Preferred Language for Healthcare:   [x]English       []other:    Visit # Insurance Allowable   18      Pain level:  3/10 w/o medication    SUBJECTIVE:  Pt states that her knee is doing better. OBJECTIVE: See eval  Observation: min- mod edema L knee; incision healing well w/o signs of infection noted; Quad fair+; Amb w/ symmetry in stride length and reciprocal arm swing w/o AD but int limp noted;   Test measurements:      Flexibility L R Comment   Hamstring slight tightness  11/3/17   Gastroc Min tightness     ITB      Quad                ROM PROM AROM Overpressure Comment    L R L R L R 17   Flexion 137         Extension   0                             Strength L R Comment   Quad good Hamstring      Gastroc                    RESTRICTIONS/PRECAUTIONS: prior surgeries in knees in 1990, 2000, 2003    Exercises/Interventions:   Exercise/Equipment Resistance/Repetitions Other comments   Cardio/Warm-up     Bike     Treadmill          Stretching     Hamstring Seated 10\"x10    Hip Flexion     ITB     Grion     Quad     Inclined Calf Standing 10\"x10    Towel Pull          ROM     Passive ERMI 10\"x10    Active     Weight Shift     Weight Hangs     Sheet Pulls     Ankle Pumps           Patellar Glides     Medial     Superior     Inferior          STRENGTH     SLR     SAQ     Supine 3x10 5#    Prone     Abduction 3x10 5#    Adducton PS 10\"x10+ bridging    Bridging- single 2x10    SLR+          Isometrics     Quad sets          CKC     Calf raises 3x10 10# total; 2x10 single    Wall sits 1'x4 w/ PS    Step ups & over L3 x30 UE assistance used int   Lat steps     Step downs     1 leg stand     Squatting Butt bumps x30 R heel lifted    CC TKE     Balance     SLS eo 30\"x3 EC, airex         Cups reaching 4 cups x5 Alternate UE   PRE     Extension 25# 3x10  RANGE:   Flexion 30# 4x10 RANGE:   Leg Press 45# L LE 3x10  70# B LE 3 x10 RANGE:        Cable Column          Gt training     Manual/Modalities                 Therapeutic Exercise and NMR EXR  [x] (26907) Provided verbal/tactile cueing for activities related to strengthening, flexibility, endurance, ROM for improvements in LE, proximal hip, and core control with self care, mobility, lifting, ambulation.  [] (20401) Provided verbal/tactile cueing for activities related to improving balance, coordination, kinesthetic sense, posture, motor skill, proprioception  to assist with LE, proximal hip, and core control in self care, mobility, lifting, ambulation and eccentric single leg control.      NMR and Therapeutic Activities:    [] (18315 or 66142) Provided verbal/tactile cueing for activities related to improving balance, coordination, kinesthetic sense, posture, motor skill, proprioception and motor activation to allow for proper function of core, proximal hip and LE with self care and ADLs  [] (78418) Gait Re-education- Provided training and instruction to the patient for proper LE, core and proximal hip recruitment and positioning and eccentric body weight control with ambulation re-education including up and down stairs     Home Exercise Program:    [x] (19138) Reviewed/Progressed HEP activities related to strengthening, flexibility, endurance, ROM of core, proximal hip and LE for functional self-care, mobility, lifting and ambulation/stair navigation 9/18/17 written HEP given 11/10/17 Reviewed  [] (47850)Reviewed/Progressed HEP activities related to improving balance, coordination, kinesthetic sense, posture, motor skill, proprioception of core, proximal hip and LE for self care, mobility, lifting, and ambulation/stair navigation      Manual Treatments:  PROM / STM / Oscillations-Mobs:  G-I, II, III, IV (PA's, Inf., Post.)  [] (12419) Provided manual therapy to mobilize LE, proximal hip and/or LS spine soft tissue/joints for the purpose of modulating pain, promoting relaxation,  increasing ROM, reducing/eliminating soft tissue swelling/inflammation/restriction, improving soft tissue extensibility and allowing for proper ROM for normal function with self care, mobility, lifting and ambulation.      Modalities:  VASO x15 min    Charges:  Timed Code Treatment Minutes: 40   Total Treatment Minutes: 60     [] EVAL (LOW) 10051 (typically 20 minutes face-to-face)  [] EVAL (MOD) 42825 (typically 30 minutes face-to-face)  [] EVAL (HIGH) 74205 (typically 45 minutes face-to-face)  [] RE-EVAL   [x] YC(86093) x  1   [] IONTO  [] NMR (32877) x      [x] VASO  [] Manual (46999) x       [] Other:  [x] TA (59945) x  1    [] Mech Traction (33387)  [] ES(attended) (31077)      [] ES (un) (33724): x15 min      GOALS:  Patient stated goal: wants to get ROM back and walk w/o pain;    Therapist goals for Patient:   Short Term Goals: To be achieved in: 2 weeks  1. Independent in HEP and progression per patient tolerance, in order to prevent re-injury. MET  2. Patient will have a decrease in pain to facilitate improvement in movement, function, and ADLs as indicated by Functional Deficits. Long Term Goals: To be achieved in: 12 weeks  1. Disability index score of 10% or less for the LEFS to assist with reaching prior level of function. 2. Patient will demonstrate increased AROM to 0-130 to allow for proper joint functioning as indicated by patients Functional Deficits. MET but continue to assess   3. Patient will demonstrate an increase in Strength to good proximal hip strength and control, within 5lb HHD in LE to allow for proper functional mobility as indicated by patients Functional Deficits. 4. Patient will return to all functional activities without increased symptoms or restriction. Progressing   5. Pt will be able to walk w/o deviations or AD for 45 min for functional activities. (patient specific functional goal) Progressing    Progression Towards Functional goals:  [x] Patient is progressing as expected towards functional goals listed. [] Progression is slowed due to complexities listed. [] Progression has been slowed due to co-morbidities. [] Plan just implemented, too soon to assess goals progression  [] Other:     ASSESSMENT:  Pt was fatigued from current program. ROM in knee staying good. Steps going better w/ improved control. Squats are challenging w/ fatigue. Progressing well toward goals. Unable to inc wts on machines today.     Treatment/Activity Tolerance:  [x] Patient tolerated treatment well [x] Patient limited by fatique  [] Patient limited by pain  [] Patient limited by other medical complications  [] Other:     Prognosis: [x] Good [] Fair  [] Poor    Patient Requires Follow-up: [x] Yes  [] No    PLAN: Cont therapy s/p L knee surg for inc in strength and function. Inc wts as able.   [x] Continue per plan of care [] Alter current plan (see comments)  [x] Plan of care initiated [] Hold pending MD visit [] Discharge    Electronically signed by: Leyla Gonzalez PT, Ramesh Brooks 87    Physical Therapist [facility] [place] license #[id number]  Physical Therapist [place] license #[id number]

## 2017-11-21 ENCOUNTER — TELEPHONE (OUTPATIENT)
Dept: ORTHOPEDIC SURGERY | Age: 47
End: 2017-11-21

## 2017-11-22 RX ORDER — HYDROCODONE BITARTRATE AND ACETAMINOPHEN 5; 325 MG/1; MG/1
2 TABLET ORAL 3 TIMES DAILY PRN
Qty: 40 TABLET | Refills: 0 | Status: SHIPPED | OUTPATIENT
Start: 2017-11-22

## 2017-11-24 ENCOUNTER — TREATMENT (OUTPATIENT)
Dept: PHYSICAL THERAPY | Age: 47
End: 2017-11-24

## 2017-11-24 DIAGNOSIS — G89.29 CHRONIC PAIN OF LEFT KNEE: ICD-10-CM

## 2017-11-24 DIAGNOSIS — R53.1 DECREASED STRENGTH: ICD-10-CM

## 2017-11-24 DIAGNOSIS — M17.12 PRIMARY OSTEOARTHRITIS OF LEFT KNEE: ICD-10-CM

## 2017-11-24 DIAGNOSIS — M25.562 CHRONIC PAIN OF LEFT KNEE: ICD-10-CM

## 2017-11-24 DIAGNOSIS — M22.42 CHONDROMALACIA PATELLA, LEFT: ICD-10-CM

## 2017-11-24 DIAGNOSIS — M17.12 OSTEOARTHRITIS OF LEFT KNEE, UNSPECIFIED OSTEOARTHRITIS TYPE: ICD-10-CM

## 2017-11-24 DIAGNOSIS — M25.462 EFFUSION OF LEFT KNEE: Primary | ICD-10-CM

## 2017-11-24 DIAGNOSIS — M25.60 DECREASED RANGE OF MOTION: ICD-10-CM

## 2017-11-24 NOTE — FLOWSHEET NOTE
[facility]   Phone: [phone]    Fax: [phone]            Physical Therapy  Cancellation/No-show Note  Patient Name:  Lucía Walden  :  1970   Date:  2017  Cancelled visits to date: 1  No-shows to date: 0    For today's appointment patient:  [x]  Cancelled  []  Rescheduled appointment  []  No-show     Reason given by patient:  []  Patient ill  []  Conflicting appointment  []  No transportation    []  Conflict with work  []  No reason given  []  Other:  Pt had problems with her car and unable to attend. She does have other appts scheduled.    Comments:      Electronically signed by:  Ludmila Oro PT, Ramesh Garcia    Physical Therapist [place] license #[id number]  Physical Therapist [place] license #[id number]

## 2017-11-29 ENCOUNTER — OFFICE VISIT (OUTPATIENT)
Dept: ORTHOPEDIC SURGERY | Age: 47
End: 2017-11-29

## 2017-11-29 ENCOUNTER — TREATMENT (OUTPATIENT)
Dept: PHYSICAL THERAPY | Age: 47
End: 2017-11-29

## 2017-11-29 VITALS
HEIGHT: 64 IN | DIASTOLIC BLOOD PRESSURE: 69 MMHG | SYSTOLIC BLOOD PRESSURE: 102 MMHG | HEART RATE: 96 BPM | BODY MASS INDEX: 21.53 KG/M2 | WEIGHT: 126.1 LBS

## 2017-11-29 DIAGNOSIS — M22.42 CHONDROMALACIA PATELLA, LEFT: ICD-10-CM

## 2017-11-29 DIAGNOSIS — R53.1 DECREASED STRENGTH: ICD-10-CM

## 2017-11-29 DIAGNOSIS — G89.29 CHRONIC PAIN OF LEFT KNEE: ICD-10-CM

## 2017-11-29 DIAGNOSIS — M25.462 EFFUSION OF LEFT KNEE: Primary | ICD-10-CM

## 2017-11-29 DIAGNOSIS — M17.12 OSTEOARTHRITIS OF LEFT KNEE, UNSPECIFIED OSTEOARTHRITIS TYPE: ICD-10-CM

## 2017-11-29 DIAGNOSIS — Z96.652 STATUS POST LEFT PARTIAL KNEE REPLACEMENT: Primary | ICD-10-CM

## 2017-11-29 DIAGNOSIS — M25.562 CHRONIC PAIN OF LEFT KNEE: ICD-10-CM

## 2017-11-29 DIAGNOSIS — M25.60 DECREASED RANGE OF MOTION: ICD-10-CM

## 2017-11-29 DIAGNOSIS — M25.462 EFFUSION OF LEFT KNEE: ICD-10-CM

## 2017-11-29 PROCEDURE — 97110 THERAPEUTIC EXERCISES: CPT | Performed by: PHYSICAL THERAPIST

## 2017-11-29 PROCEDURE — 97112 NEUROMUSCULAR REEDUCATION: CPT | Performed by: PHYSICAL THERAPIST

## 2017-11-29 PROCEDURE — 99024 POSTOP FOLLOW-UP VISIT: CPT | Performed by: ORTHOPAEDIC SURGERY

## 2017-11-29 PROCEDURE — 97530 THERAPEUTIC ACTIVITIES: CPT | Performed by: PHYSICAL THERAPIST

## 2017-11-29 PROCEDURE — 97016 VASOPNEUMATIC DEVICE THERAPY: CPT | Performed by: PHYSICAL THERAPIST

## 2017-11-29 NOTE — PROGRESS NOTES
History of Present Illness:    Jack Phillips is a 41 y.o. female   . She comes in today for follow-up after patellofemoral surgery. She notes her pain as a 2 out of 10. Her surgery was approximately 2 months ago      Chief Complaint , a summary of previous treatments, injury, and current reason for the visit:    She's doing much better. She is concerns of swelling. Her medications, which she was using for her bipolar depression have now been adjusted. She is on Wellbutrin and her other medicine and no longer has a side effects from the change in medications, which cause serious sedation and secondary anxiety, stress    Examination:    On examination today, her left knee looks much better. Her swelling is down and she notes 137° of bend. Patellofemoral tracking is perfect without instability. She notes she's able to go up and down stairs, up and down hills and has no limitations and discomfort        Office Procedures:  No orders of the defined types were placed in this encounter. X-ray interpretation:  X-ray review from last visit shows a well aligned patellofemoral replacement    Treatment Plan:  Clinically doing exceedingly well. Excellent alignment. Excellent stability. No evidence of infection. Good flexibility    Final impresson and disposition: Has shown dramatic functional improvement. Will be continue on her anti-inflammatory or curcumon is necessary. Follow-up in 4 weeks                      Gilmar Loya M.D. This dictation was performed with a verbal recognition program and it was checked for errors. It is possible that there are still dictated errors within this office note. Any errors should be brought immediately to my attention for correction.   All efforts were made to ensure that this office note is accurate

## 2017-11-29 NOTE — PLAN OF CARE
[facility]   Phone: [phone]    Fax: [phone]                                                       Physical Therapy Daily Treatment Note/POC   Physical Therapy Re-Certification Plan of Care    Dear  ,    We had the pleasure of treating the following patient for physical therapy services at [address] [place] [address]. A summary of our findings can be found in the updated assessment below. This includes our plan of care. If you have any questions or concerns regarding these findings, please do not hesitate to contact me at the office phone number checked above. Thank you for the referral.     Physician Signature:________________________________Date:__________________  By signing above (or electronic signature), therapists plan is approved by physician      Overall Response to Treatment:   []Patient is responding well to treatment and improvement is noted with regards  to goals   []Patient should continue to improve in reasonable time if they continue HEP   []Patient has plateaued and is no longer responding to skilled PT intervention    []Patient is getting worse and would benefit from return to referring MD   []Patient unable to adhere to initial POC   []Other: Pt has good motion w/o effort. Strength is progressing slowly but pt is not compliant w/ HEP. Gt skills good w/o AD. Stairs going well w/ control. Progressing well and ready to be d/c'd to HEP.     Date:  2017    Patient Name:  Jyoti Bernard    :  1970  MRN: [id number]  Medical/Treatment Diagnosis Information:  Diagnosis: L patella femoral replacement; Z98.890 9/15/17  Treatment Diagnosis: dec ROM, dec strength, knee pain, dec gt skills,   Insurance/Certification information:  PT Insurance Information: Lorri  Physician Information:  Referring Practitioner: Sergio Landry of care signed (Y/N):     Date of Patient follow up with Physician: today    G-Code (if applicable):      Date G-Code Applied: 5#    Prone     Abduction 3x10 5#    Adducton PS 10\"x10+ bridging- single    Bridging- single 2x10    SLR+          Isometrics     Quad sets          CKC     Calf raises 3x10 10# total; 2x10 single    Wall sits 1'x4 w/ PS    Step ups & over L3 x30 UE assistance used int   Lat steps     Step downs L3 x15    1 leg stand     Squatting Butt bumps x30 R heel lifted    CC TKE     Balance     SLS eo 30\"x3 EC, airex         Cups reaching 4 cups x5 Alternate UE   PRE     Extension 25# 3x10  RANGE:   Flexion 30# 3x10 RANGE:   Leg Press 45# L LE 3x10   RANGE:  Hold due to pt not able to do        [employer]  training     Manual/Modalities                 Therapeutic Exercise and NMR EXR  [x] (13415) Provided verbal/tactile cueing for activities related to strengthening, flexibility, endurance, ROM for improvements in LE, proximal hip, and core control with self care, mobility, lifting, ambulation.  [] (36796) Provided verbal/tactile cueing for activities related to improving balance, coordination, kinesthetic sense, posture, motor skill, proprioception  to assist with LE, proximal hip, and core control in self care, mobility, lifting, ambulation and eccentric single leg control.      NMR and Therapeutic Activities:    [] (41033 or 42656) Provided verbal/tactile cueing for activities related to improving balance, coordination, kinesthetic sense, posture, motor skill, proprioception and motor activation to allow for proper function of core, proximal hip and LE with self care and ADLs  [] (07579) Gait Re-education- Provided training and instruction to the patient for proper LE, core and proximal hip recruitment and positioning and eccentric body weight control with ambulation re-education including up and down stairs     Home Exercise Program:    [x] (76572) Reviewed/Progressed HEP activities related to strengthening, flexibility, endurance, ROM of core, proximal hip and LE for functional self-care, mobility, lifting

## 2017-12-01 ENCOUNTER — TREATMENT (OUTPATIENT)
Dept: PHYSICAL THERAPY | Age: 47
End: 2017-12-01

## 2017-12-01 DIAGNOSIS — G89.29 CHRONIC PAIN OF LEFT KNEE: ICD-10-CM

## 2017-12-01 DIAGNOSIS — M25.60 DECREASED RANGE OF MOTION: ICD-10-CM

## 2017-12-01 DIAGNOSIS — M25.462 EFFUSION OF LEFT KNEE: Primary | ICD-10-CM

## 2017-12-01 DIAGNOSIS — R53.1 DECREASED STRENGTH: ICD-10-CM

## 2017-12-01 DIAGNOSIS — M17.12 OSTEOARTHRITIS OF LEFT KNEE, UNSPECIFIED OSTEOARTHRITIS TYPE: ICD-10-CM

## 2017-12-01 DIAGNOSIS — M22.42 CHONDROMALACIA PATELLA, LEFT: ICD-10-CM

## 2017-12-01 DIAGNOSIS — M25.562 CHRONIC PAIN OF LEFT KNEE: ICD-10-CM

## 2017-12-01 PROCEDURE — 97110 THERAPEUTIC EXERCISES: CPT | Performed by: PHYSICAL THERAPIST

## 2017-12-01 PROCEDURE — 97112 NEUROMUSCULAR REEDUCATION: CPT | Performed by: PHYSICAL THERAPIST

## 2017-12-01 PROCEDURE — 97016 VASOPNEUMATIC DEVICE THERAPY: CPT | Performed by: PHYSICAL THERAPIST

## 2017-12-01 PROCEDURE — 97530 THERAPEUTIC ACTIVITIES: CPT | Performed by: PHYSICAL THERAPIST

## 2017-12-01 NOTE — FLOWSHEET NOTE
eccentric single leg control. NMR and Therapeutic Activities:    [] (60961 or 33282) Provided verbal/tactile cueing for activities related to improving balance, coordination, kinesthetic sense, posture, motor skill, proprioception and motor activation to allow for proper function of core, proximal hip and LE with self care and ADLs  [] (75760) Gait Re-education- Provided training and instruction to the patient for proper LE, core and proximal hip recruitment and positioning and eccentric body weight control with ambulation re-education including up and down stairs     Home Exercise Program:    [x] (88000) Reviewed/Progressed HEP activities related to strengthening, flexibility, endurance, ROM of core, proximal hip and LE for functional self-care, mobility, lifting and ambulation/stair navigation 9/18/17 written HEP given 11/10/17 Reviewed  [] (29994)Reviewed/Progressed HEP activities related to improving balance, coordination, kinesthetic sense, posture, motor skill, proprioception of core, proximal hip and LE for self care, mobility, lifting, and ambulation/stair navigation      Manual Treatments:  PROM / STM / Oscillations-Mobs:  G-I, II, III, IV (PA's, Inf., Post.)  [] (09718) Provided manual therapy to mobilize LE, proximal hip and/or LS spine soft tissue/joints for the purpose of modulating pain, promoting relaxation,  increasing ROM, reducing/eliminating soft tissue swelling/inflammation/restriction, improving soft tissue extensibility and allowing for proper ROM for normal function with self care, mobility, lifting and ambulation.      Modalities:  VASO x15 min    Charges:  Timed Code Treatment Minutes: 58   Total Treatment Minutes: 73     [] EVAL (LOW) 89140 (typically 20 minutes face-to-face)  [] EVAL (MOD) 40020 (typically 30 minutes face-to-face)  [] EVAL (HIGH) 62720 (typically 45 minutes face-to-face)  [] RE-EVAL   [x] GH(54314) x  1   [] IONTO  [x] NMR (91008) x  1   [x] VASO  [] Manual (09640) x [] Other:  [x] TA (12126) x  1    [] Mech Traction (19844)  [] ES(attended) (35171)      [] ES (un) (33045): x15 min      GOALS:  Patient stated goal: wants to get ROM back and walk w/o pain; Therapist goals for Patient:   Short Term Goals: To be achieved in: 2 weeks  1. Independent in HEP and progression per patient tolerance, in order to prevent re-injury. MET  2. Patient will have a decrease in pain to facilitate improvement in movement, function, and ADLs as indicated by Functional Deficits. Long Term Goals: To be achieved in: 12 weeks  1. Disability index score of 10% or less for the LEFS to assist with reaching prior level of function. 2. Patient will demonstrate increased AROM to 0-130 to allow for proper joint functioning as indicated by patients Functional Deficits. MET   3. Patient will demonstrate an increase in Strength to good proximal hip strength and control, within 5lb HHD in LE to allow for proper functional mobility as indicated by patients Functional Deficits. Progressing  4. Patient will return to all functional activities without increased symptoms or restriction. Progressing   5. Pt will be able to walk w/o deviations or AD for 45 min for functional activities. (patient specific functional goal) MET    Progression Towards Functional goals:  [x] Patient is progressing as expected towards functional goals listed. [] Progression is slowed due to complexities listed. [] Progression has been slowed due to co-morbidities. [] Plan just implemented, too soon to assess goals progression  [] Other:     ASSESSMENT:  Pt had difficulty w/ strengthening exercises. She had to dec wts today due to fatigue and challenge. Balance better statically but challenged dynamically. Edema resolving.     Treatment/Activity Tolerance:  [x] Patient tolerated treatment well [x] Patient limited by fatique  [] Patient limited by pain  [] Patient limited by other medical complications  [] Other:

## 2017-12-06 ENCOUNTER — TREATMENT (OUTPATIENT)
Dept: PHYSICAL THERAPY | Age: 47
End: 2017-12-06

## 2017-12-06 DIAGNOSIS — M17.12 OSTEOARTHRITIS OF LEFT KNEE, UNSPECIFIED OSTEOARTHRITIS TYPE: ICD-10-CM

## 2017-12-06 DIAGNOSIS — R53.1 DECREASED STRENGTH: ICD-10-CM

## 2017-12-06 DIAGNOSIS — M25.462 EFFUSION OF LEFT KNEE: Primary | ICD-10-CM

## 2017-12-06 DIAGNOSIS — M25.562 CHRONIC PAIN OF LEFT KNEE: ICD-10-CM

## 2017-12-06 DIAGNOSIS — M25.60 DECREASED RANGE OF MOTION: ICD-10-CM

## 2017-12-06 DIAGNOSIS — G89.29 CHRONIC PAIN OF LEFT KNEE: ICD-10-CM

## 2017-12-06 DIAGNOSIS — M22.42 CHONDROMALACIA PATELLA, LEFT: ICD-10-CM

## 2017-12-06 PROCEDURE — 97016 VASOPNEUMATIC DEVICE THERAPY: CPT | Performed by: PHYSICAL THERAPIST

## 2017-12-06 PROCEDURE — 97110 THERAPEUTIC EXERCISES: CPT | Performed by: PHYSICAL THERAPIST

## 2017-12-06 PROCEDURE — 97112 NEUROMUSCULAR REEDUCATION: CPT | Performed by: PHYSICAL THERAPIST

## 2017-12-06 PROCEDURE — 97530 THERAPEUTIC ACTIVITIES: CPT | Performed by: PHYSICAL THERAPIST

## 2017-12-06 NOTE — FLOWSHEET NOTE
Extension   0                             Strength L R Comment   Quad good     Hamstring      Gastroc                    RESTRICTIONS/PRECAUTIONS: prior surgeries in knees in 1990, 2000, 2003    Exercises/Interventions:   Exercise/Equipment Resistance/Repetitions Other comments   Cardio/Warm-up     Bike     Treadmill          Stretching     Hamstring Seated 10\"x10    Hip Flexion     ITB     Grion     Quad     Inclined Calf Standing 10\"x10    Towel Pull          ROM     Passive ERMI 10\"x10    Active     Weight Shift     Weight Hangs     Sheet Pulls     Ankle Pumps           Patellar Glides     Medial     Superior     Inferior          STRENGTH     SLR     SAQ     Supine 3x10 5#    Prone     Abduction 3x10 5#    Adducton  2x10+ bridging- single    Bridging- single 2x10    SLR+ 30\" + 5 pumps=2 reps         Isometrics     Quad sets          CKC     Calf raises 3x10 10# total; 2x10 single    Wall sits 1'x4 w/ PS    Step ups & over L3 x30 UE assistance used int   Lat steps     Step downs L3 x15    1 leg stand     Squatting Butt bumps x30\" x2 20 max   CC TKE     Balance     SLS eo 30\"x3 EC, airex         Cups reaching 4 cups x5 Alternate UE   Ladder drills NPV         PRE     Extension 20# 3x10  RANGE:   Flexion 30# 3x10  Up B & down single for 1 set RANGE:   Leg Press 45# L LE 3x10  65# B LE 3 x10 RANGE:          Cable Column          Gt training     Manual/Modalities                 Therapeutic Exercise and NMR EXR  [x] (96133) Provided verbal/tactile cueing for activities related to strengthening, flexibility, endurance, ROM for improvements in LE, proximal hip, and core control with self care, mobility, lifting, ambulation.  [] (37205) Provided verbal/tactile cueing for activities related to improving balance, coordination, kinesthetic sense, posture, motor skill, proprioception  to assist with LE, proximal hip, and core control in self care, mobility, lifting, ambulation and eccentric single leg control.      NMR Yes  [] No    PLAN: Cont therapy s/p L knee surg for inc in strength and function. Inc wts as able. Consider ladder drill next session.   [x] Continue per plan of care [] Alter current plan (see comments)  [x] Plan of care initiated [] Hold pending MD visit [] Discharge    Electronically signed by: Janeth Mendez PT, Ramesh Brooks 89    Physical Therapist [facility] license #[id number]  Physical Therapist [place] license #[id number]

## 2017-12-08 ENCOUNTER — TREATMENT (OUTPATIENT)
Dept: PHYSICAL THERAPY | Age: 47
End: 2017-12-08

## 2017-12-08 DIAGNOSIS — M25.462 EFFUSION OF LEFT KNEE: Primary | ICD-10-CM

## 2017-12-08 DIAGNOSIS — G89.29 CHRONIC PAIN OF LEFT KNEE: ICD-10-CM

## 2017-12-08 DIAGNOSIS — M22.42 CHONDROMALACIA PATELLA, LEFT: ICD-10-CM

## 2017-12-08 DIAGNOSIS — M17.12 OSTEOARTHRITIS OF LEFT KNEE, UNSPECIFIED OSTEOARTHRITIS TYPE: ICD-10-CM

## 2017-12-08 DIAGNOSIS — M25.60 DECREASED RANGE OF MOTION: ICD-10-CM

## 2017-12-08 DIAGNOSIS — R53.1 DECREASED STRENGTH: ICD-10-CM

## 2017-12-08 DIAGNOSIS — M25.562 CHRONIC PAIN OF LEFT KNEE: ICD-10-CM

## 2017-12-08 PROCEDURE — 97112 NEUROMUSCULAR REEDUCATION: CPT | Performed by: PHYSICAL THERAPIST

## 2017-12-08 PROCEDURE — 97530 THERAPEUTIC ACTIVITIES: CPT | Performed by: PHYSICAL THERAPIST

## 2017-12-08 PROCEDURE — 97016 VASOPNEUMATIC DEVICE THERAPY: CPT | Performed by: PHYSICAL THERAPIST

## 2017-12-08 PROCEDURE — 97110 THERAPEUTIC EXERCISES: CPT | Performed by: PHYSICAL THERAPIST

## 2017-12-08 NOTE — FLOWSHEET NOTE
face-to-face)  [] EVAL (HIGH) 37533 (typically 45 minutes face-to-face)  [] RE-EVAL   [x] DC(07357) x  1   [] IONTO  [x] NMR (70326) x  1   [x] VASO  [] Manual (04367) x       [] Other:  [x] TA (72241) x  1    [] Mech Traction (38565)  [] ES(attended) (07607)      [] ES (un) (17671): x15 min      GOALS:  Patient stated goal: wants to get ROM back and walk w/o pain; Therapist goals for Patient:   Short Term Goals: To be achieved in: 2 weeks  1. Independent in HEP and progression per patient tolerance, in order to prevent re-injury. MET  2. Patient will have a decrease in pain to facilitate improvement in movement, function, and ADLs as indicated by Functional Deficits. Long Term Goals: To be achieved in: 12 weeks  1. Disability index score of 10% or less for the LEFS to assist with reaching prior level of function. 2. Patient will demonstrate increased AROM to 0-130 to allow for proper joint functioning as indicated by patients Functional Deficits. MET   3. Patient will demonstrate an increase in Strength to good proximal hip strength and control, within 5lb HHD in LE to allow for proper functional mobility as indicated by patients Functional Deficits. Progressing  4. Patient will return to all functional activities without increased symptoms or restriction. Progressing   5. Pt will be able to walk w/o deviations or AD for 45 min for functional activities. (patient specific functional goal) MET    Progression Towards Functional goals:  [x] Patient is progressing as expected towards functional goals listed. [] Progression is slowed due to complexities listed. [] Progression has been slowed due to co-morbidities. [] Plan just implemented, too soon to assess goals progression  [] Other:     ASSESSMENT:  Pt more motivated w/ therapy program w/ less pain and edema in knee. Function progressing. Pt is doing more exercises at home. Progressing toward goals.  Less breaks during therapy needed. Treatment/Activity Tolerance:  [x] Patient tolerated treatment well [x] Patient limited by fatique  [] Patient limited by pain  [] Patient limited by other medical complications  [] Other:     Prognosis: [x] Good [] Fair  [] Poor    Patient Requires Follow-up: [x] Yes  [] No    PLAN: Cont therapy s/p L knee surg for inc in strength and function. Inc wts as able.    [x] Continue per plan of care [] Alter current plan (see comments)  [x] Plan of care initiated [] Hold pending MD visit [] Discharge    Electronically signed by: Orquidea Leal PT, Ramesh Brooks 89    Physical Therapist [facility] license #[id number]  Physical Therapist [place] license #[id number]

## 2017-12-11 ENCOUNTER — TREATMENT (OUTPATIENT)
Dept: PHYSICAL THERAPY | Age: 47
End: 2017-12-11

## 2017-12-11 DIAGNOSIS — M25.462 EFFUSION OF LEFT KNEE: Primary | ICD-10-CM

## 2017-12-11 DIAGNOSIS — G89.29 CHRONIC PAIN OF LEFT KNEE: ICD-10-CM

## 2017-12-11 DIAGNOSIS — M25.60 DECREASED RANGE OF MOTION: ICD-10-CM

## 2017-12-11 DIAGNOSIS — M17.12 OSTEOARTHRITIS OF LEFT KNEE, UNSPECIFIED OSTEOARTHRITIS TYPE: ICD-10-CM

## 2017-12-11 DIAGNOSIS — M17.12 PRIMARY OSTEOARTHRITIS OF LEFT KNEE: ICD-10-CM

## 2017-12-11 DIAGNOSIS — R53.1 DECREASED STRENGTH: ICD-10-CM

## 2017-12-11 DIAGNOSIS — M25.562 CHRONIC PAIN OF LEFT KNEE: ICD-10-CM

## 2017-12-11 DIAGNOSIS — M22.42 CHONDROMALACIA PATELLA, LEFT: ICD-10-CM

## 2017-12-15 ENCOUNTER — TREATMENT (OUTPATIENT)
Dept: PHYSICAL THERAPY | Age: 47
End: 2017-12-15

## 2017-12-15 DIAGNOSIS — M22.42 CHONDROMALACIA PATELLA, LEFT: ICD-10-CM

## 2017-12-15 DIAGNOSIS — M25.60 DECREASED RANGE OF MOTION: ICD-10-CM

## 2017-12-15 DIAGNOSIS — M25.562 CHRONIC PAIN OF LEFT KNEE: ICD-10-CM

## 2017-12-15 DIAGNOSIS — M25.462 EFFUSION OF LEFT KNEE: Primary | ICD-10-CM

## 2017-12-15 DIAGNOSIS — M17.12 OSTEOARTHRITIS OF LEFT KNEE, UNSPECIFIED OSTEOARTHRITIS TYPE: ICD-10-CM

## 2017-12-15 DIAGNOSIS — R53.1 DECREASED STRENGTH: ICD-10-CM

## 2017-12-15 DIAGNOSIS — G89.29 CHRONIC PAIN OF LEFT KNEE: ICD-10-CM

## 2017-12-15 PROCEDURE — 97530 THERAPEUTIC ACTIVITIES: CPT | Performed by: PHYSICAL THERAPIST

## 2017-12-15 PROCEDURE — 97016 VASOPNEUMATIC DEVICE THERAPY: CPT | Performed by: PHYSICAL THERAPIST

## 2017-12-15 PROCEDURE — 97110 THERAPEUTIC EXERCISES: CPT | Performed by: PHYSICAL THERAPIST

## 2017-12-15 PROCEDURE — 97112 NEUROMUSCULAR REEDUCATION: CPT | Performed by: PHYSICAL THERAPIST

## 2017-12-15 NOTE — FLOWSHEET NOTE
[facility]   Phone: [phone]    Fax: [phone]                                                       Physical Therapy Daily Treatment Note    Date:  12/15/2017    Patient Name:  Asuncion Halsted    :  1970  MRN: [id number]  Medical/Treatment Diagnosis Information:  Diagnosis: L patella femoral replacement; Z98.890 9/15/17  Treatment Diagnosis: dec ROM, dec strength, knee pain, dec gt skills,   Insurance/Certification information:  PT Insurance Information: Lorri  Physician Information:  Referring Practitioner: Saira Powers of care signed (Y/N):     Date of Patient follow up with Physician: today    G-Code (if applicable):      Date G-Code Applied:  17  PT G-Codes  Functional Assessment Tool Used: LEFS  Score: 880=10%  Functional Limitation: Mobility: Walking and moving around  Mobility: Walking and Moving Around Current Status (): At least 80 percent but less than 100 percent impaired, limited or restricted  Mobility: Walking and Moving Around Goal Status (): At least 1 percent but less than 20 percent impaired, limited or restricted    17 LEFS 43/80=53.5 (46.5% disability)  17 LEFS 43/80=53.5% (46.5 disablity)    Progress Note: [x]  Yes  []  No  Next due by: Visit #10       Latex Allergy:  [x]NO      []YES  Preferred Language for Healthcare:   [x]English       []other:    Visit # Insurance Allowable   22      Pain level:  3/10 w/o medication    SUBJECTIVE:  Pt has been exercising at home and with private  last night. She is having min soreness in knee but no edema. Pt is getting excited about how well knee is starting to move. OBJECTIVE: See eval  Observation: min edema L knee; incision healing well w/o signs of infection noted; Quad fair+; Amb w/ symmetry in stride length and reciprocal arm swing w/o AD w/o limp;   Test measurements:      Flexibility L R Comment   Hamstring slight tightness  17   Gastroc WNL     ITB      Quad LE, proximal hip, and core control in self care, mobility, lifting, ambulation and eccentric single leg control. NMR and Therapeutic Activities:    [] (63171 or 31003) Provided verbal/tactile cueing for activities related to improving balance, coordination, kinesthetic sense, posture, motor skill, proprioception and motor activation to allow for proper function of core, proximal hip and LE with self care and ADLs  [] (12674) Gait Re-education- Provided training and instruction to the patient for proper LE, core and proximal hip recruitment and positioning and eccentric body weight control with ambulation re-education including up and down stairs     Home Exercise Program:    [x] (46496) Reviewed/Progressed HEP activities related to strengthening, flexibility, endurance, ROM of core, proximal hip and LE for functional self-care, mobility, lifting and ambulation/stair navigation 9/18/17 written HEP given 12/15/17 Reviewed  [] (04552)Reviewed/Progressed HEP activities related to improving balance, coordination, kinesthetic sense, posture, motor skill, proprioception of core, proximal hip and LE for self care, mobility, lifting, and ambulation/stair navigation      Manual Treatments:  PROM / STM / Oscillations-Mobs:  G-I, II, III, IV (PA's, Inf., Post.)  [] (28353) Provided manual therapy to mobilize LE, proximal hip and/or LS spine soft tissue/joints for the purpose of modulating pain, promoting relaxation,  increasing ROM, reducing/eliminating soft tissue swelling/inflammation/restriction, improving soft tissue extensibility and allowing for proper ROM for normal function with self care, mobility, lifting and ambulation.      Modalities:  VASO x15 min    Charges:  Timed Code Treatment Minutes: 58   Total Treatment Minutes: 73     [] EVAL (LOW) 45259 (typically 20 minutes face-to-face)  [] EVAL (MOD) 94564 (typically 30 minutes face-to-face)  [] EVAL (HIGH) 32403 (typically 45 minutes face-to-face)  [] RE-EVAL   [x] DX(52778) x  1   [] IONTO  [x] NMR (93860) x  1   [x] VASO  [] Manual (04802) x       [] Other:  [x] TA (71432) x  1    [] Mech Traction (35434)  [] ES(attended) (63573)      [] ES (un) (57199): x15 min      GOALS:  Patient stated goal: wants to get ROM back and walk w/o pain; Therapist goals for Patient:   Short Term Goals: To be achieved in: 2 weeks  1. Independent in HEP and progression per patient tolerance, in order to prevent re-injury. MET  2. Patient will have a decrease in pain to facilitate improvement in movement, function, and ADLs as indicated by Functional Deficits. Long Term Goals: To be achieved in: 12 weeks  1. Disability index score of 10% or less for the LEFS to assist with reaching prior level of function. 2. Patient will demonstrate increased AROM to 0-130 to allow for proper joint functioning as indicated by patients Functional Deficits. MET   3. Patient will demonstrate an increase in Strength to good proximal hip strength and control, within 5lb HHD in LE to allow for proper functional mobility as indicated by patients Functional Deficits. Progressing  4. Patient will return to all functional activities without increased symptoms or restriction. Progressing   5. Pt will be able to walk w/o deviations or AD for 45 min for functional activities. (patient specific functional goal) MET    Progression Towards Functional goals:  [x] Patient is progressing as expected towards functional goals listed. [] Progression is slowed due to complexities listed. [] Progression has been slowed due to co-morbidities. [] Plan just implemented, too soon to assess goals progression  [] Other:     ASSESSMENT:  Pt able to do ladder drills w/ improved speed and agility. Strength progressing w/ less rest breaks needed during program. Progressing well toward goals. ROM in knee full w/o effort.     Treatment/Activity Tolerance:  [x] Patient tolerated treatment well [x] Patient limited by linden  [] Patient

## 2017-12-22 ENCOUNTER — TREATMENT (OUTPATIENT)
Dept: PHYSICAL THERAPY | Age: 47
End: 2017-12-22

## 2017-12-22 DIAGNOSIS — M17.12 OSTEOARTHRITIS OF LEFT KNEE, UNSPECIFIED OSTEOARTHRITIS TYPE: ICD-10-CM

## 2017-12-22 DIAGNOSIS — R53.1 DECREASED STRENGTH: ICD-10-CM

## 2017-12-22 DIAGNOSIS — M25.462 EFFUSION OF LEFT KNEE: Primary | ICD-10-CM

## 2017-12-22 DIAGNOSIS — M22.42 CHONDROMALACIA PATELLA, LEFT: ICD-10-CM

## 2017-12-22 DIAGNOSIS — G89.29 CHRONIC PAIN OF LEFT KNEE: ICD-10-CM

## 2017-12-22 DIAGNOSIS — M25.562 CHRONIC PAIN OF LEFT KNEE: ICD-10-CM

## 2017-12-22 DIAGNOSIS — M25.60 DECREASED RANGE OF MOTION: ICD-10-CM

## 2017-12-22 PROCEDURE — 97016 VASOPNEUMATIC DEVICE THERAPY: CPT | Performed by: PHYSICAL THERAPIST

## 2017-12-22 PROCEDURE — 97110 THERAPEUTIC EXERCISES: CPT | Performed by: PHYSICAL THERAPIST

## 2017-12-22 PROCEDURE — 97530 THERAPEUTIC ACTIVITIES: CPT | Performed by: PHYSICAL THERAPIST

## 2017-12-22 PROCEDURE — 97112 NEUROMUSCULAR REEDUCATION: CPT | Performed by: PHYSICAL THERAPIST

## 2017-12-22 NOTE — FLOWSHEET NOTE
[facility]   Phone: [phone]    Fax: [phone]                                                       Physical Therapy Daily Treatment Note/Discharge Note    Date:  2017    Patient Name:  Jacey Giang    :  1970  MRN: [id number]  Medical/Treatment Diagnosis Information:  Diagnosis: L patella femoral replacement; Z98.890 9/15/17  Treatment Diagnosis: dec ROM, dec strength, knee pain, dec gt skills,   Insurance/Certification information:  PT Insurance Information: Lorri  Physician Information:  Referring Practitioner: Sharonda Holbrook of care signed (Y/N):     Date of Patient follow up with Physician: today    G-Code (if applicable):      Date G-Code Applied:  17  PT G-Codes  Functional Assessment Tool Used: LEFS  Score: 880=10%  Functional Limitation: Mobility: Walking and moving around  Mobility: Walking and Moving Around Current Status (): At least 80 percent but less than 100 percent impaired, limited or restricted  Mobility: Walking and Moving Around Goal Status (): At least 1 percent but less than 20 percent impaired, limited or restricted    17 LEFS 43/80=53.5 (46.5% disability)  17 LEFS 43/80=53.5% (46.5 disablity)  17 LEFS 52/80=65% (35% disability)    Progress Note: [x]  Yes  []  No  Next due by: Visit #10       Latex Allergy:  [x]NO      []YES  Preferred Language for Healthcare:   [x]English       []other:    Visit # Insurance Allowable   23      Pain level:  0/10 w/o medication    SUBJECTIVE:  Pt has been working w/  but did not perform any exercises this week due to illness. OBJECTIVE: See eval  Observation: min edema L knee; incision healing well w/o signs of infection noted; Amb w/ symmetry in stride length and reciprocal arm swing w/o AD w/o limp;   Test measurements:      Flexibility L R Comment   Hamstring slight tightness  17   Gastroc WNL     ITB      Quad                ROM PROM AROM Overpressure Comment    L ambulation and eccentric single leg control. NMR and Therapeutic Activities:    [] (39564 or 17166) Provided verbal/tactile cueing for activities related to improving balance, coordination, kinesthetic sense, posture, motor skill, proprioception and motor activation to allow for proper function of core, proximal hip and LE with self care and ADLs  [] (20121) Gait Re-education- Provided training and instruction to the patient for proper LE, core and proximal hip recruitment and positioning and eccentric body weight control with ambulation re-education including up and down stairs     Home Exercise Program:    [x] (98106) Reviewed/Progressed HEP activities related to strengthening, flexibility, endurance, ROM of core, proximal hip and LE for functional self-care, mobility, lifting and ambulation/stair navigation 9/18/17 written HEP given 12/15/17 Reviewed  [] (92844)Reviewed/Progressed HEP activities related to improving balance, coordination, kinesthetic sense, posture, motor skill, proprioception of core, proximal hip and LE for self care, mobility, lifting, and ambulation/stair navigation      Manual Treatments:  PROM / STM / Oscillations-Mobs:  G-I, II, III, IV (PA's, Inf., Post.)  [] (20180) Provided manual therapy to mobilize LE, proximal hip and/or LS spine soft tissue/joints for the purpose of modulating pain, promoting relaxation,  increasing ROM, reducing/eliminating soft tissue swelling/inflammation/restriction, improving soft tissue extensibility and allowing for proper ROM for normal function with self care, mobility, lifting and ambulation.      Modalities:  VASO x15 min    Charges:  Timed Code Treatment Minutes: 58   Total Treatment Minutes: 73     [] EVAL (LOW) 08807 (typically 20 minutes face-to-face)  [] EVAL (MOD) 29495 (typically 30 minutes face-to-face)  [] EVAL (HIGH) 72741 (typically 45 minutes face-to-face)  [] RE-EVAL   [x] AW(52769) x  1   [] IONTO  [x] NMR (92983) x  1   [x] VASO  [] Poor    Patient Requires Follow-up: [x] Yes  [] No    PLAN: D/c to HEP. Pt has PT information for questions or problems.   [] Continue per plan of care [] Alter current plan (see comments)  [] Plan of care initiated [] Hold pending MD visit [x] Discharge    Electronically signed by: Megan Rodriguez PT, Ramesh Brooks 89    Physical Therapist [facility] license #[id number]  Physical Therapist [place] license #[id number]

## 2017-12-27 ENCOUNTER — OFFICE VISIT (OUTPATIENT)
Dept: ORTHOPEDIC SURGERY | Age: 47
End: 2017-12-27

## 2017-12-27 VITALS
HEIGHT: 64 IN | DIASTOLIC BLOOD PRESSURE: 71 MMHG | SYSTOLIC BLOOD PRESSURE: 104 MMHG | BODY MASS INDEX: 21.53 KG/M2 | HEART RATE: 91 BPM | WEIGHT: 126.1 LBS

## 2017-12-27 DIAGNOSIS — M25.462 EFFUSION OF LEFT KNEE: ICD-10-CM

## 2017-12-27 DIAGNOSIS — Z96.652 STATUS POST LEFT PARTIAL KNEE REPLACEMENT: Primary | ICD-10-CM

## 2017-12-27 PROCEDURE — 99213 OFFICE O/P EST LOW 20 MIN: CPT | Performed by: ORTHOPAEDIC SURGERY

## 2017-12-27 RX ORDER — BUPROPION HYDROCHLORIDE 150 MG/1
150 TABLET ORAL EVERY MORNING
COMMUNITY

## 2018-04-03 ENCOUNTER — EVALUATION (OUTPATIENT)
Dept: PHYSICAL THERAPY | Age: 48
End: 2018-04-03

## 2018-04-03 DIAGNOSIS — M25.511 BILATERAL SHOULDER PAIN, UNSPECIFIED CHRONICITY: Primary | ICD-10-CM

## 2018-04-03 DIAGNOSIS — M25.619 LIMITED RANGE OF MOTION (ROM) OF SHOULDER: ICD-10-CM

## 2018-04-03 DIAGNOSIS — M25.512 BILATERAL SHOULDER PAIN, UNSPECIFIED CHRONICITY: Primary | ICD-10-CM

## 2018-04-03 PROCEDURE — 97530 THERAPEUTIC ACTIVITIES: CPT | Performed by: PHYSICAL THERAPIST

## 2018-04-03 PROCEDURE — 97112 NEUROMUSCULAR REEDUCATION: CPT | Performed by: PHYSICAL THERAPIST

## 2018-04-03 PROCEDURE — 97161 PT EVAL LOW COMPLEX 20 MIN: CPT | Performed by: PHYSICAL THERAPIST

## 2018-04-03 PROCEDURE — 97110 THERAPEUTIC EXERCISES: CPT | Performed by: PHYSICAL THERAPIST

## 2018-04-06 ENCOUNTER — TREATMENT (OUTPATIENT)
Dept: PHYSICAL THERAPY | Age: 48
End: 2018-04-06

## 2018-04-06 DIAGNOSIS — M25.511 BILATERAL SHOULDER PAIN, UNSPECIFIED CHRONICITY: Primary | ICD-10-CM

## 2018-04-06 DIAGNOSIS — M25.512 BILATERAL SHOULDER PAIN, UNSPECIFIED CHRONICITY: Primary | ICD-10-CM

## 2018-04-06 DIAGNOSIS — M25.619 LIMITED RANGE OF MOTION (ROM) OF SHOULDER: ICD-10-CM

## 2018-04-09 ENCOUNTER — TREATMENT (OUTPATIENT)
Dept: PHYSICAL THERAPY | Age: 48
End: 2018-04-09

## 2018-04-09 DIAGNOSIS — M25.512 BILATERAL SHOULDER PAIN, UNSPECIFIED CHRONICITY: Primary | ICD-10-CM

## 2018-04-09 DIAGNOSIS — M25.511 BILATERAL SHOULDER PAIN, UNSPECIFIED CHRONICITY: Primary | ICD-10-CM

## 2018-04-09 DIAGNOSIS — M25.462 EFFUSION OF LEFT KNEE: ICD-10-CM

## 2018-04-09 DIAGNOSIS — M25.619 LIMITED RANGE OF MOTION (ROM) OF SHOULDER: ICD-10-CM

## 2018-04-09 DIAGNOSIS — M25.60 DECREASED RANGE OF MOTION: ICD-10-CM

## 2018-04-09 PROCEDURE — 97530 THERAPEUTIC ACTIVITIES: CPT | Performed by: PHYSICAL THERAPIST

## 2018-04-09 PROCEDURE — 97110 THERAPEUTIC EXERCISES: CPT | Performed by: PHYSICAL THERAPIST

## 2018-04-09 PROCEDURE — 97140 MANUAL THERAPY 1/> REGIONS: CPT | Performed by: PHYSICAL THERAPIST

## 2018-04-11 ENCOUNTER — TREATMENT (OUTPATIENT)
Dept: PHYSICAL THERAPY | Age: 48
End: 2018-04-11

## 2018-04-20 ENCOUNTER — TREATMENT (OUTPATIENT)
Dept: PHYSICAL THERAPY | Age: 48
End: 2018-04-20

## 2018-04-20 DIAGNOSIS — M25.512 BILATERAL SHOULDER PAIN, UNSPECIFIED CHRONICITY: Primary | ICD-10-CM

## 2018-04-20 DIAGNOSIS — M25.619 LIMITED RANGE OF MOTION (ROM) OF SHOULDER: ICD-10-CM

## 2018-04-20 DIAGNOSIS — R53.1 DECREASED STRENGTH: ICD-10-CM

## 2018-04-20 DIAGNOSIS — M25.60 DECREASED RANGE OF MOTION: ICD-10-CM

## 2018-04-20 DIAGNOSIS — M25.511 BILATERAL SHOULDER PAIN, UNSPECIFIED CHRONICITY: Primary | ICD-10-CM

## 2018-04-20 PROCEDURE — 97110 THERAPEUTIC EXERCISES: CPT | Performed by: PHYSICAL THERAPIST

## 2018-04-20 PROCEDURE — 97140 MANUAL THERAPY 1/> REGIONS: CPT | Performed by: PHYSICAL THERAPIST

## 2018-04-20 PROCEDURE — 97530 THERAPEUTIC ACTIVITIES: CPT | Performed by: PHYSICAL THERAPIST

## 2018-05-04 ENCOUNTER — TREATMENT (OUTPATIENT)
Dept: PHYSICAL THERAPY | Age: 48
End: 2018-05-04

## 2018-05-04 DIAGNOSIS — R53.1 DECREASED STRENGTH: ICD-10-CM

## 2018-05-04 DIAGNOSIS — M25.619 LIMITED RANGE OF MOTION (ROM) OF SHOULDER: ICD-10-CM

## 2018-05-04 DIAGNOSIS — M25.60 DECREASED RANGE OF MOTION: ICD-10-CM

## 2018-05-04 DIAGNOSIS — M25.511 BILATERAL SHOULDER PAIN, UNSPECIFIED CHRONICITY: Primary | ICD-10-CM

## 2018-05-04 DIAGNOSIS — M25.512 BILATERAL SHOULDER PAIN, UNSPECIFIED CHRONICITY: Primary | ICD-10-CM

## 2018-05-04 PROCEDURE — 97110 THERAPEUTIC EXERCISES: CPT | Performed by: PHYSICAL THERAPIST

## 2018-05-04 PROCEDURE — 97140 MANUAL THERAPY 1/> REGIONS: CPT | Performed by: PHYSICAL THERAPIST

## 2018-05-04 PROCEDURE — 97530 THERAPEUTIC ACTIVITIES: CPT | Performed by: PHYSICAL THERAPIST

## 2018-05-11 ENCOUNTER — TREATMENT (OUTPATIENT)
Dept: PHYSICAL THERAPY | Age: 48
End: 2018-05-11

## 2018-05-11 DIAGNOSIS — M25.60 DECREASED RANGE OF MOTION: ICD-10-CM

## 2018-05-11 DIAGNOSIS — M25.619 LIMITED RANGE OF MOTION (ROM) OF SHOULDER: ICD-10-CM

## 2018-05-11 DIAGNOSIS — M25.511 BILATERAL SHOULDER PAIN, UNSPECIFIED CHRONICITY: Primary | ICD-10-CM

## 2018-05-11 DIAGNOSIS — M25.512 BILATERAL SHOULDER PAIN, UNSPECIFIED CHRONICITY: Primary | ICD-10-CM

## 2018-05-11 DIAGNOSIS — R53.1 DECREASED STRENGTH: ICD-10-CM

## 2018-05-18 ENCOUNTER — TREATMENT (OUTPATIENT)
Dept: PHYSICAL THERAPY | Age: 48
End: 2018-05-18

## 2018-05-18 DIAGNOSIS — M25.619 LIMITED RANGE OF MOTION (ROM) OF SHOULDER: ICD-10-CM

## 2018-05-18 DIAGNOSIS — M25.60 DECREASED RANGE OF MOTION: ICD-10-CM

## 2018-05-18 DIAGNOSIS — R53.1 DECREASED STRENGTH: ICD-10-CM

## 2018-05-18 DIAGNOSIS — M25.512 BILATERAL SHOULDER PAIN, UNSPECIFIED CHRONICITY: Primary | ICD-10-CM

## 2018-05-18 DIAGNOSIS — M25.511 BILATERAL SHOULDER PAIN, UNSPECIFIED CHRONICITY: Primary | ICD-10-CM

## 2018-05-25 ENCOUNTER — TREATMENT (OUTPATIENT)
Dept: PHYSICAL THERAPY | Age: 48
End: 2018-05-25

## 2018-05-25 DIAGNOSIS — R53.1 DECREASED STRENGTH: ICD-10-CM

## 2018-05-25 DIAGNOSIS — M25.619 LIMITED RANGE OF MOTION (ROM) OF SHOULDER: ICD-10-CM

## 2018-05-25 DIAGNOSIS — M25.511 BILATERAL SHOULDER PAIN, UNSPECIFIED CHRONICITY: Primary | ICD-10-CM

## 2018-05-25 DIAGNOSIS — M25.512 BILATERAL SHOULDER PAIN, UNSPECIFIED CHRONICITY: Primary | ICD-10-CM

## 2018-05-25 DIAGNOSIS — M25.60 DECREASED RANGE OF MOTION: ICD-10-CM

## 2018-05-25 PROCEDURE — 97530 THERAPEUTIC ACTIVITIES: CPT | Performed by: PHYSICAL THERAPIST

## 2018-05-25 PROCEDURE — 97110 THERAPEUTIC EXERCISES: CPT | Performed by: PHYSICAL THERAPIST

## 2018-05-25 PROCEDURE — 97140 MANUAL THERAPY 1/> REGIONS: CPT | Performed by: PHYSICAL THERAPIST

## 2018-06-01 ENCOUNTER — TREATMENT (OUTPATIENT)
Dept: PHYSICAL THERAPY | Age: 48
End: 2018-06-01

## 2018-06-01 DIAGNOSIS — M25.512 BILATERAL SHOULDER PAIN, UNSPECIFIED CHRONICITY: Primary | ICD-10-CM

## 2018-06-01 DIAGNOSIS — M25.511 BILATERAL SHOULDER PAIN, UNSPECIFIED CHRONICITY: Primary | ICD-10-CM

## 2018-06-01 DIAGNOSIS — M25.619 LIMITED RANGE OF MOTION (ROM) OF SHOULDER: ICD-10-CM

## 2018-06-01 DIAGNOSIS — M25.60 DECREASED RANGE OF MOTION: ICD-10-CM

## 2018-06-01 DIAGNOSIS — R53.1 DECREASED STRENGTH: ICD-10-CM

## 2018-06-01 PROCEDURE — 97530 THERAPEUTIC ACTIVITIES: CPT | Performed by: PHYSICAL THERAPIST

## 2018-06-01 PROCEDURE — 97110 THERAPEUTIC EXERCISES: CPT | Performed by: PHYSICAL THERAPIST

## 2018-06-01 PROCEDURE — 97140 MANUAL THERAPY 1/> REGIONS: CPT | Performed by: PHYSICAL THERAPIST

## 2018-06-06 ENCOUNTER — TREATMENT (OUTPATIENT)
Dept: PHYSICAL THERAPY | Age: 48
End: 2018-06-06

## 2018-06-06 DIAGNOSIS — M25.60 DECREASED RANGE OF MOTION: ICD-10-CM

## 2018-06-06 DIAGNOSIS — M25.619 LIMITED RANGE OF MOTION (ROM) OF SHOULDER: ICD-10-CM

## 2018-06-06 DIAGNOSIS — M25.512 BILATERAL SHOULDER PAIN, UNSPECIFIED CHRONICITY: Primary | ICD-10-CM

## 2018-06-06 DIAGNOSIS — M25.511 BILATERAL SHOULDER PAIN, UNSPECIFIED CHRONICITY: Primary | ICD-10-CM

## 2018-06-06 DIAGNOSIS — R53.1 DECREASED STRENGTH: ICD-10-CM

## 2019-11-23 ENCOUNTER — HOSPITAL ENCOUNTER (EMERGENCY)
Age: 49
Discharge: HOME | End: 2019-11-23
Attending: EMERGENCY MEDICINE
Payer: COMMERCIAL

## 2019-11-23 VITALS
HEIGHT: 65 IN | WEIGHT: 114 LBS | OXYGEN SATURATION: 99 % | SYSTOLIC BLOOD PRESSURE: 111 MMHG | DIASTOLIC BLOOD PRESSURE: 69 MMHG | RESPIRATION RATE: 18 BRPM | BODY MASS INDEX: 18.99 KG/M2 | HEART RATE: 81 BPM | TEMPERATURE: 98 F

## 2019-11-23 DIAGNOSIS — R11.0 NAUSEA: Primary | ICD-10-CM

## 2019-11-23 LAB
ANION GAP SERPL CALCULATED.3IONS-SCNC: 14 MMOL/L (ref 3–16)
BASOPHILS ABSOLUTE: 0 K/UL (ref 0–0.2)
BASOPHILS RELATIVE PERCENT: 0.6 %
BUN BLDV-MCNC: 7 MG/DL (ref 7–20)
CALCIUM SERPL-MCNC: 9.5 MG/DL (ref 8.3–10.6)
CHLORIDE BLD-SCNC: 105 MMOL/L (ref 99–110)
CO2: 20 MMOL/L (ref 21–32)
CREAT SERPL-MCNC: 0.7 MG/DL (ref 0.6–1.1)
EOSINOPHILS ABSOLUTE: 0.3 K/UL (ref 0–0.6)
EOSINOPHILS RELATIVE PERCENT: 4.9 %
GFR AFRICAN AMERICAN: >60
GFR NON-AFRICAN AMERICAN: >60
GLUCOSE BLD-MCNC: 102 MG/DL (ref 70–99)
HCT VFR BLD CALC: 36.4 % (ref 36–48)
HEMOGLOBIN: 12.1 G/DL (ref 12–16)
LYMPHOCYTES ABSOLUTE: 1.7 K/UL (ref 1–5.1)
LYMPHOCYTES RELATIVE PERCENT: 28.8 %
MAGNESIUM: 2.1 MG/DL (ref 1.8–2.4)
MCH RBC QN AUTO: 32.5 PG (ref 26–34)
MCHC RBC AUTO-ENTMCNC: 33.2 G/DL (ref 31–36)
MCV RBC AUTO: 98 FL (ref 80–100)
MONOCYTES ABSOLUTE: 0.6 K/UL (ref 0–1.3)
MONOCYTES RELATIVE PERCENT: 10 %
NEUTROPHILS ABSOLUTE: 3.2 K/UL (ref 1.7–7.7)
NEUTROPHILS RELATIVE PERCENT: 55.7 %
PDW BLD-RTO: 12.8 % (ref 12.4–15.4)
PLATELET # BLD: 315 K/UL (ref 135–450)
PMV BLD AUTO: 7.9 FL (ref 5–10.5)
POTASSIUM REFLEX MAGNESIUM: 3 MMOL/L (ref 3.5–5.1)
RBC # BLD: 3.72 M/UL (ref 4–5.2)
SODIUM BLD-SCNC: 139 MMOL/L (ref 136–145)
WBC # BLD: 5.8 K/UL (ref 4–11)

## 2019-11-23 PROCEDURE — 6370000000 HC RX 637 (ALT 250 FOR IP): Performed by: STUDENT IN AN ORGANIZED HEALTH CARE EDUCATION/TRAINING PROGRAM

## 2019-11-23 PROCEDURE — 85025 COMPLETE CBC W/AUTO DIFF WBC: CPT

## 2019-11-23 PROCEDURE — 96375 TX/PRO/DX INJ NEW DRUG ADDON: CPT

## 2019-11-23 PROCEDURE — 83735 ASSAY OF MAGNESIUM: CPT

## 2019-11-23 PROCEDURE — 2580000003 HC RX 258: Performed by: STUDENT IN AN ORGANIZED HEALTH CARE EDUCATION/TRAINING PROGRAM

## 2019-11-23 PROCEDURE — 6360000002 HC RX W HCPCS: Performed by: STUDENT IN AN ORGANIZED HEALTH CARE EDUCATION/TRAINING PROGRAM

## 2019-11-23 PROCEDURE — 96374 THER/PROPH/DIAG INJ IV PUSH: CPT

## 2019-11-23 PROCEDURE — 80048 BASIC METABOLIC PNL TOTAL CA: CPT

## 2019-11-23 PROCEDURE — 99283 EMERGENCY DEPT VISIT LOW MDM: CPT

## 2019-11-23 PROCEDURE — 96361 HYDRATE IV INFUSION ADD-ON: CPT

## 2019-11-23 RX ORDER — SODIUM CHLORIDE, SODIUM LACTATE, POTASSIUM CHLORIDE, CALCIUM CHLORIDE 600; 310; 30; 20 MG/100ML; MG/100ML; MG/100ML; MG/100ML
INJECTION, SOLUTION INTRAVENOUS
Status: DISCONTINUED
Start: 2019-11-23 | End: 2019-11-23 | Stop reason: HOSPADM

## 2019-11-23 RX ORDER — HALOPERIDOL 5 MG/ML
INJECTION INTRAMUSCULAR
Status: DISCONTINUED
Start: 2019-11-23 | End: 2019-11-23 | Stop reason: HOSPADM

## 2019-11-23 RX ORDER — HALOPERIDOL 5 MG/ML
5 INJECTION INTRAMUSCULAR ONCE
Status: COMPLETED | OUTPATIENT
Start: 2019-11-23 | End: 2019-11-23

## 2019-11-23 RX ORDER — SODIUM CHLORIDE, SODIUM LACTATE, POTASSIUM CHLORIDE, CALCIUM CHLORIDE 600; 310; 30; 20 MG/100ML; MG/100ML; MG/100ML; MG/100ML
1000 INJECTION, SOLUTION INTRAVENOUS ONCE
Status: COMPLETED | OUTPATIENT
Start: 2019-11-23 | End: 2019-11-23

## 2019-11-23 RX ORDER — POTASSIUM CHLORIDE 1.5 G/1.77G
40 POWDER, FOR SOLUTION ORAL ONCE
Status: DISCONTINUED | OUTPATIENT
Start: 2019-11-23 | End: 2019-11-23 | Stop reason: CLARIF

## 2019-11-23 RX ORDER — ONDANSETRON 2 MG/ML
8 INJECTION INTRAMUSCULAR; INTRAVENOUS ONCE
Status: COMPLETED | OUTPATIENT
Start: 2019-11-23 | End: 2019-11-23

## 2019-11-23 RX ADMIN — HALOPERIDOL LACTATE 5 MG: 5 INJECTION, SOLUTION INTRAMUSCULAR at 16:55

## 2019-11-23 RX ADMIN — SODIUM CHLORIDE, POTASSIUM CHLORIDE, SODIUM LACTATE AND CALCIUM CHLORIDE 1000 ML: 600; 310; 30; 20 INJECTION, SOLUTION INTRAVENOUS at 14:00

## 2019-11-23 RX ADMIN — POTASSIUM BICARBONATE 40 MEQ: 782 TABLET, EFFERVESCENT ORAL at 15:31

## 2019-11-23 RX ADMIN — SODIUM CHLORIDE, POTASSIUM CHLORIDE, SODIUM LACTATE AND CALCIUM CHLORIDE 1000 ML: 600; 310; 30; 20 INJECTION, SOLUTION INTRAVENOUS at 15:31

## 2019-11-23 RX ADMIN — ONDANSETRON 8 MG: 2 INJECTION INTRAMUSCULAR; INTRAVENOUS at 14:00

## 2019-11-23 ASSESSMENT — ENCOUNTER SYMPTOMS
RESPIRATORY NEGATIVE: 1
ALLERGIC/IMMUNOLOGIC NEGATIVE: 1
ABDOMINAL PAIN: 1
DIARRHEA: 1
VOMITING: 1
EYES NEGATIVE: 1
NAUSEA: 1